# Patient Record
Sex: MALE | Race: WHITE | Employment: OTHER | URBAN - METROPOLITAN AREA
[De-identification: names, ages, dates, MRNs, and addresses within clinical notes are randomized per-mention and may not be internally consistent; named-entity substitution may affect disease eponyms.]

---

## 2019-08-05 ENCOUNTER — OFFICE VISIT (OUTPATIENT)
Dept: FAMILY MEDICINE | Facility: CLINIC | Age: 24
End: 2019-08-05
Payer: COMMERCIAL

## 2019-08-05 VITALS
DIASTOLIC BLOOD PRESSURE: 84 MMHG | HEIGHT: 76 IN | OXYGEN SATURATION: 98 % | WEIGHT: 188 LBS | BODY MASS INDEX: 22.89 KG/M2 | SYSTOLIC BLOOD PRESSURE: 132 MMHG | TEMPERATURE: 98.6 F | HEART RATE: 63 BPM

## 2019-08-05 DIAGNOSIS — Z00.00 ROUTINE GENERAL MEDICAL EXAMINATION AT A HEALTH CARE FACILITY: Primary | ICD-10-CM

## 2019-08-05 DIAGNOSIS — Z13.6 CARDIOVASCULAR SCREENING; LDL GOAL LESS THAN 100: ICD-10-CM

## 2019-08-05 DIAGNOSIS — Z72.0 TOBACCO ABUSE: ICD-10-CM

## 2019-08-05 DIAGNOSIS — Z13.1 SCREENING FOR DIABETES MELLITUS: ICD-10-CM

## 2019-08-05 DIAGNOSIS — I10 ESSENTIAL HYPERTENSION: ICD-10-CM

## 2019-08-05 LAB
ANION GAP SERPL CALCULATED.3IONS-SCNC: 7 MMOL/L (ref 3–14)
BUN SERPL-MCNC: 18 MG/DL (ref 7–30)
CALCIUM SERPL-MCNC: 9.6 MG/DL (ref 8.5–10.1)
CHLORIDE SERPL-SCNC: 101 MMOL/L (ref 94–109)
CHOLEST SERPL-MCNC: 141 MG/DL
CO2 SERPL-SCNC: 27 MMOL/L (ref 20–32)
CREAT SERPL-MCNC: 1 MG/DL (ref 0.66–1.25)
GFR SERPL CREATININE-BSD FRML MDRD: >90 ML/MIN/{1.73_M2}
GLUCOSE SERPL-MCNC: 81 MG/DL (ref 70–99)
HDLC SERPL-MCNC: 52 MG/DL
LDLC SERPL CALC-MCNC: 79 MG/DL
NONHDLC SERPL-MCNC: 89 MG/DL
POTASSIUM SERPL-SCNC: 4.2 MMOL/L (ref 3.4–5.3)
SODIUM SERPL-SCNC: 135 MMOL/L (ref 133–144)
TRIGL SERPL-MCNC: 51 MG/DL

## 2019-08-05 PROCEDURE — 80048 BASIC METABOLIC PNL TOTAL CA: CPT | Performed by: FAMILY MEDICINE

## 2019-08-05 PROCEDURE — 99385 PREV VISIT NEW AGE 18-39: CPT | Performed by: FAMILY MEDICINE

## 2019-08-05 PROCEDURE — 99213 OFFICE O/P EST LOW 20 MIN: CPT | Mod: 25 | Performed by: FAMILY MEDICINE

## 2019-08-05 PROCEDURE — 80061 LIPID PANEL: CPT | Performed by: FAMILY MEDICINE

## 2019-08-05 PROCEDURE — 36415 COLL VENOUS BLD VENIPUNCTURE: CPT | Performed by: FAMILY MEDICINE

## 2019-08-05 RX ORDER — LOSARTAN POTASSIUM 50 MG/1
50 TABLET ORAL DAILY
COMMUNITY
End: 2019-08-05

## 2019-08-05 RX ORDER — LOSARTAN POTASSIUM 50 MG/1
50 TABLET ORAL DAILY
Qty: 90 TABLET | Refills: 3 | Status: SHIPPED | OUTPATIENT
Start: 2019-08-05 | End: 2020-12-09

## 2019-08-05 ASSESSMENT — ANXIETY QUESTIONNAIRES
2. NOT BEING ABLE TO STOP OR CONTROL WORRYING: NOT AT ALL
GAD7 TOTAL SCORE: 3
5. BEING SO RESTLESS THAT IT IS HARD TO SIT STILL: NOT AT ALL
6. BECOMING EASILY ANNOYED OR IRRITABLE: SEVERAL DAYS
7. FEELING AFRAID AS IF SOMETHING AWFUL MIGHT HAPPEN: NOT AT ALL
1. FEELING NERVOUS, ANXIOUS, OR ON EDGE: SEVERAL DAYS
3. WORRYING TOO MUCH ABOUT DIFFERENT THINGS: NOT AT ALL

## 2019-08-05 ASSESSMENT — PATIENT HEALTH QUESTIONNAIRE - PHQ9
SUM OF ALL RESPONSES TO PHQ QUESTIONS 1-9: 3
5. POOR APPETITE OR OVEREATING: SEVERAL DAYS

## 2019-08-05 ASSESSMENT — MIFFLIN-ST. JEOR: SCORE: 1936.32

## 2019-08-05 NOTE — PROGRESS NOTES
SUBJECTIVE:   CC: Ovidio Burns is an 23 year old male who presents for preventive health visit.     Healthy Habits:    Do you get at least three servings of calcium containing foods daily (dairy, green leafy vegetables, etc.)? yes    Amount of exercise or daily activities, outside of work: 7 day(s) per week    Problems taking medications regularly Yes     Medication side effects: Yes freq urination    Have you had an eye exam in the past two years? no    Do you see a dentist twice per year? yes    Do you have sleep apnea, excessive snoring or daytime drowsiness?trouble falling asleep. Takes melatonin          Today's PHQ-2 Score: No flowsheet data found.    Abuse: Current or Past(Physical, Sexual or Emotional)- No  Do you feel safe in your environment? Yes    Social History     Tobacco Use     Smoking status: Former Smoker     Types: Dip, chew, snus or snuff     Smokeless tobacco: Current User     Types: Chew     Tobacco comment: chews-tin last 1-2 days   Substance Use Topics     Alcohol use: Yes     Comment: 4 drinks per week     If you drink alcohol do you typically have >3 drinks per day or >7 drinks per week? No                      Last PSA: No results found for: PSA    Reviewed orders with patient. Reviewed health maintenance and updated orders accordingly - Yes      Reviewed and updated as needed this visit by clinical staff  Tobacco  Allergies  Meds  Med Hx  Surg Hx  Fam Hx  Soc Hx        Reviewed and updated as needed this visit by Provider            ROS:  Review Of Systems  Skin: negative  Eyes: negative  Ears/Nose/Throat: negative  Respiratory: No shortness of breath, dyspnea on exertion, cough, or hemoptysis  Cardiovascular: negative  Gastrointestinal: negative  Genitourinary: urinary frequency some, no dyuria.  Has some mild ed.   Musculoskeletal: negative  Neurologic: negative  Psychiatric: negative  Hematologic/Lymphatic/Immunologic: negative  Endocrine: negative      OBJECTIVE:   BP  "132/84 (Cuff Size: Adult Large)   Pulse 63   Temp 98.6  F (37  C) (Tympanic)   Ht 1.918 m (6' 3.5\")   Wt 85.3 kg (188 lb)   SpO2 98%   BMI 23.19 kg/m    EXAM:  GENERAL: healthy, alert and no distress  EYES: Eyes grossly normal to inspection, PERRL and conjunctivae and sclerae normal  HENT: ear canals and TM's normal, nose and mouth without ulcers or lesions  NECK: no adenopathy, no asymmetry, masses, or scars and thyroid normal to palpation  RESP: lungs clear to auscultation - no rales, rhonchi or wheezes  CV: regular rate and rhythm, normal S1 S2, no S3 or S4, no murmur, click or rub, no peripheral edema and peripheral pulses strong  ABDOMEN: soft, nontender, no hepatosplenomegaly, no masses and bowel sounds normal   (male): normal male genitalia without lesions or urethral discharge, no hernia  MS: no gross musculoskeletal defects noted, no edema  SKIN: no suspicious lesions or rashes  NEURO: Normal strength and tone, mentation intact and speech normal  PSYCH: mentation appears normal, affect normal/bright  LYMPH: anterior cervical: no adenopathy  posterior cervical: no adenopathy        ASSESSMENT/PLAN:   (Z00.00) Routine general medical examination at a health care facility  (primary encounter diagnosis)  Comment: Discussed healthy lifestyle and preventative cares.    Plan:     (I10) Essential hypertension  Comment: controlled, reviewed medication, he has some concerns with urinary frequency.  In addition has some mild ED where it takes longer to get an erection.    Plan: Basic metabolic panel, losartan (COZAAR) 50 MG         tablet, OFFICE/OUTPT VISIT,EST,LEVL III            (Z72.0) Tobacco abuse  Comment: recommend to quit and information is given.  Plan: OFFICE/OUTPT VISIT,EST,LEVL III            (Z13.1) Screening for diabetes mellitus  Comment:   Plan:     (Z13.6) CARDIOVASCULAR SCREENING; LDL GOAL LESS THAN 100  Comment:   Plan: Lipid panel reflex to direct LDL Fasting        " "      COUNSELING:  Reviewed preventive health counseling, as reflected in patient instructions       Regular exercise       Healthy diet/nutrition       Vision screening    Estimated body mass index is 23.19 kg/m  as calculated from the following:    Height as of this encounter: 1.918 m (6' 3.5\").    Weight as of this encounter: 85.3 kg (188 lb).         reports that he has quit smoking. His smoking use included dip, chew, snus or snuff. His smokeless tobacco use includes chew.  Tobacco Cessation Action Plan: he has information for quit plan    Counseling Resources:  ATP IV Guidelines  Pooled Cohorts Equation Calculator  FRAX Risk Assessment  ICSI Preventive Guidelines  Dietary Guidelines for Americans, 2010  USDA's MyPlate  ASA Prophylaxis  Lung CA Screening    Alfredito Stephenson MD  [facility] [place]  "

## 2019-08-05 NOTE — PATIENT INSTRUCTIONS
Please go to lab.    I refilled your medication.    If you do have further issues with the medication please let me know and I will change your losartan medication to another one.  I would consider starting amlodipine 2.5 mg a day.    Please be aware that there will be an additional charge during your preventative visit due to either a new diagnosis and/or chronic disease management.    Preventative visits screen for diseases prior to they occur.  They do not cover for any new diagnosis or chronic disease management.     If you have questions regarding your coverage please check with your insurance provider.  At [facility] we need to code correctly to be in compliance with all insurance companies.      Preventive Health Recommendations  Male Ages 21 - 25     Yearly exam:             See your health care provider every year in order to  o   Review health changes.   o   Discuss preventive care.    o   Review your medicines if your doctor has prescribed any.    You should be tested each year for STDs (sexually transmitted diseases).     Talk to your provider about cholesterol testing.      If you are at risk for diabetes, you should have a diabetes test (fasting glucose).    Shots: Get a flu shot each year. Get a tetanus shot every 10 years.     Nutrition:    Eat at least 5 servings of fruits and vegetables daily.     Eat whole-grain bread, whole-wheat pasta and brown rice instead of white grains and rice.     Get adequate calcium and Vitamin D.     Lifestyle    Exercise for at least 150 minutes a week (30 minutes a day, 5 days a week). This will help you control your weight and prevent disease.     Limit alcohol to one drink per day.     No smoking.     Wear sunscreen to prevent skin cancer.     See your dentist every six months for an exam and cleaning.

## 2019-08-06 ASSESSMENT — ASTHMA QUESTIONNAIRES: ACT_TOTALSCORE: 24

## 2019-08-06 ASSESSMENT — ANXIETY QUESTIONNAIRES: GAD7 TOTAL SCORE: 3

## 2019-08-13 PROBLEM — I10 ESSENTIAL HYPERTENSION: Status: ACTIVE | Noted: 2019-08-13

## 2020-04-15 ENCOUNTER — HOSPITAL ENCOUNTER (EMERGENCY)
Facility: CLINIC | Age: 25
Discharge: HOME | End: 2020-04-15
Attending: FAMILY MEDICINE | Admitting: FAMILY MEDICINE
Payer: COMMERCIAL

## 2020-04-15 ENCOUNTER — APPOINTMENT (OUTPATIENT)
Dept: GENERAL RADIOLOGY | Facility: CLINIC | Age: 25
End: 2020-04-15
Attending: FAMILY MEDICINE
Payer: COMMERCIAL

## 2020-04-15 ENCOUNTER — NURSE TRIAGE (OUTPATIENT)
Dept: FAMILY MEDICINE | Facility: CLINIC | Age: 25
End: 2020-04-15

## 2020-04-15 VITALS
RESPIRATION RATE: 17 BRPM | DIASTOLIC BLOOD PRESSURE: 108 MMHG | BODY MASS INDEX: 25.49 KG/M2 | WEIGHT: 205 LBS | HEART RATE: 75 BPM | TEMPERATURE: 98 F | OXYGEN SATURATION: 96 % | SYSTOLIC BLOOD PRESSURE: 155 MMHG | HEIGHT: 75 IN

## 2020-04-15 DIAGNOSIS — R07.89 CHEST TIGHTNESS: ICD-10-CM

## 2020-04-15 DIAGNOSIS — R09.A2 GLOBUS SENSATION: ICD-10-CM

## 2020-04-15 DIAGNOSIS — R00.2 PALPITATIONS: ICD-10-CM

## 2020-04-15 LAB
ANION GAP SERPL CALCULATED.3IONS-SCNC: 4 MMOL/L (ref 3–14)
BASOPHILS # BLD AUTO: 0.1 10E9/L (ref 0–0.2)
BASOPHILS NFR BLD AUTO: 1.2 %
BUN SERPL-MCNC: 13 MG/DL (ref 7–30)
CALCIUM SERPL-MCNC: 9.9 MG/DL (ref 8.5–10.1)
CHLORIDE SERPL-SCNC: 101 MMOL/L (ref 94–109)
CO2 SERPL-SCNC: 29 MMOL/L (ref 20–32)
CREAT SERPL-MCNC: 1.31 MG/DL (ref 0.66–1.25)
DIFFERENTIAL METHOD BLD: NORMAL
EOSINOPHIL # BLD AUTO: 0 10E9/L (ref 0–0.7)
EOSINOPHIL NFR BLD AUTO: 0.5 %
ERYTHROCYTE [DISTWIDTH] IN BLOOD BY AUTOMATED COUNT: 12.9 % (ref 10–15)
GFR SERPL CREATININE-BSD FRML MDRD: 75 ML/MIN/{1.73_M2}
GLUCOSE SERPL-MCNC: 148 MG/DL (ref 70–99)
HCT VFR BLD AUTO: 47.3 % (ref 40–53)
HGB BLD-MCNC: 15.3 G/DL (ref 13.3–17.7)
IMM GRANULOCYTES # BLD: 0 10E9/L (ref 0–0.4)
IMM GRANULOCYTES NFR BLD: 0.7 %
LYMPHOCYTES # BLD AUTO: 1.4 10E9/L (ref 0.8–5.3)
LYMPHOCYTES NFR BLD AUTO: 23 %
MCH RBC QN AUTO: 28.8 PG (ref 26.5–33)
MCHC RBC AUTO-ENTMCNC: 32.3 G/DL (ref 31.5–36.5)
MCV RBC AUTO: 89 FL (ref 78–100)
MONOCYTES # BLD AUTO: 0.8 10E9/L (ref 0–1.3)
MONOCYTES NFR BLD AUTO: 12.7 %
NEUTROPHILS # BLD AUTO: 3.8 10E9/L (ref 1.6–8.3)
NEUTROPHILS NFR BLD AUTO: 61.9 %
NRBC # BLD AUTO: 0 10*3/UL
NRBC BLD AUTO-RTO: 0 /100
PLATELET # BLD AUTO: 417 10E9/L (ref 150–450)
POTASSIUM SERPL-SCNC: 3.6 MMOL/L (ref 3.4–5.3)
RBC # BLD AUTO: 5.32 10E12/L (ref 4.4–5.9)
SODIUM SERPL-SCNC: 134 MMOL/L (ref 133–144)
TROPONIN I SERPL-MCNC: <0.015 UG/L (ref 0–0.04)
TSH SERPL DL<=0.005 MIU/L-ACNC: 1.77 MU/L (ref 0.4–4)
WBC # BLD AUTO: 6.1 10E9/L (ref 4–11)

## 2020-04-15 PROCEDURE — 93010 ELECTROCARDIOGRAM REPORT: CPT | Mod: Z6 | Performed by: FAMILY MEDICINE

## 2020-04-15 PROCEDURE — 85025 COMPLETE CBC W/AUTO DIFF WBC: CPT | Performed by: FAMILY MEDICINE

## 2020-04-15 PROCEDURE — 84443 ASSAY THYROID STIM HORMONE: CPT | Performed by: FAMILY MEDICINE

## 2020-04-15 PROCEDURE — 99285 EMERGENCY DEPT VISIT HI MDM: CPT | Performed by: FAMILY MEDICINE

## 2020-04-15 PROCEDURE — 71046 X-RAY EXAM CHEST 2 VIEWS: CPT

## 2020-04-15 PROCEDURE — 99285 EMERGENCY DEPT VISIT HI MDM: CPT | Mod: 25 | Performed by: FAMILY MEDICINE

## 2020-04-15 PROCEDURE — 93005 ELECTROCARDIOGRAM TRACING: CPT | Performed by: FAMILY MEDICINE

## 2020-04-15 PROCEDURE — 84484 ASSAY OF TROPONIN QUANT: CPT | Performed by: FAMILY MEDICINE

## 2020-04-15 PROCEDURE — 80048 BASIC METABOLIC PNL TOTAL CA: CPT | Performed by: FAMILY MEDICINE

## 2020-04-15 ASSESSMENT — ENCOUNTER SYMPTOMS
CHILLS: 0
DIARRHEA: 0
NAUSEA: 0
SORE THROAT: 0
WHEEZING: 0
PALPITATIONS: 1
VOMITING: 0
FEVER: 0
BLOOD IN STOOL: 0
ABDOMINAL PAIN: 0
SHORTNESS OF BREATH: 1
COUGH: 0
HEADACHES: 0
DIAPHORESIS: 1
DYSURIA: 0
SINUS PRESSURE: 0
CONSTIPATION: 0
FREQUENCY: 0

## 2020-04-15 ASSESSMENT — MIFFLIN-ST. JEOR: SCORE: 2000.5

## 2020-04-15 NOTE — ED PROVIDER NOTES
History     Chief Complaint   Patient presents with     Palpitations     intermittent x 3 days.  some SOA last NOC     HPI  Ovidio Burns is a 24 year old male who presents with palpitations that have been intermittent over the last 3 days with last night starting with some sensation of anterior chest pressure midline along with a sensation of neck discomfort and no radiation to the back or to the arms.  No significant exertional pain but does have increasing pain when he was doing weights in his garage.  Associated sweats last evening no fever cough upper respiratory congestion.  No VTE risk.  No history of asthma.  No known coronary disease.  No stimulant use.  Denies any use of illicit drugs.  No use of methamphetamine or cocaine or bath salts.  He does use tobacco chewing and occasional alcohol.  Walking does not necessarily worsen his symptoms although there may be mild dyspnea on exertion.  No associated wheezing.      He describes palpitations that occur periodically and are followed by an sense of a strong beat likely consistent with PVCs.  Also with globus sensation in the throat.  Denies significant panic or anxiety.    Denies recent prolonged travel (>3 hours) by car or plane, history or FHx of venous thromboembolism, recent surgery (last 4 weeks), active cancer history, hypercoagulable state, estrogen or other medications/conditions causing VTE or  new unilateral swelling or pain in the legs or calves.    Allergies:  Allergies   Allergen Reactions     Lisinopril      Numb fingers       Problem List:    Patient Active Problem List    Diagnosis Date Noted     Essential hypertension 08/13/2019     Priority: Medium     Mild major depression (H) 01/07/2015     Priority: Medium     Anxiety 01/07/2015     Priority: Medium     Uvular hypertrophy 09/10/2012     Priority: Medium     Exercise-induced asthma 04/28/2009     Priority: Medium        Past Medical History:    Past Medical History:   Diagnosis Date  "    Other abnormal heart sounds At Birth       Past Surgical History:    Past Surgical History:   Procedure Laterality Date     CIRCUMCISION,CLAMP  1994     HC TOOTH EXTRACTION W/FORCEP  2002       Family History:    Family History   Problem Relation Age of Onset     Hypertension Maternal Grandmother      Breast Cancer Paternal Grandmother      Diabetes Paternal Grandfather      Hypertension Paternal Grandfather      Depression Mother      Hypertension Father      Cerebrovascular Disease Maternal Grandfather      Heart Disease Maternal Grandfather         MIs and CABG     Mental Illness Brother         anxiety       Social History:  Marital Status:  Single [1]  Social History     Tobacco Use     Smoking status: Former Smoker     Types: Dip, chew, snus or snuff     Smokeless tobacco: Current User     Types: Chew     Tobacco comment: chews-tin last 1-2 days   Substance Use Topics     Alcohol use: Yes     Comment: 4 drinks per week     Drug use: No        Medications:    losartan (COZAAR) 50 MG tablet          Review of Systems   Constitutional: Positive for diaphoresis. Negative for chills and fever.   HENT: Negative for ear pain, sinus pressure and sore throat.    Eyes: Negative for visual disturbance.   Respiratory: Positive for shortness of breath. Negative for cough and wheezing.    Cardiovascular: Positive for chest pain and palpitations.   Gastrointestinal: Negative for abdominal pain, blood in stool, constipation, diarrhea, nausea and vomiting.   Genitourinary: Negative for dysuria, frequency and urgency.   Skin: Negative for rash.   Neurological: Negative for headaches.   All other systems reviewed and are negative.      Physical Exam   BP: (!) 155/108  Pulse: 75(irregular.  75-89 in triage)  Temp: 98  F (36.7  C)  Resp: 18  Height: 190.5 cm (6' 3\")  Weight: 93 kg (205 lb)  SpO2: 98 %      Physical Exam  Constitutional:       General: He is not in acute distress.     Appearance: He is not ill-appearing, " toxic-appearing or diaphoretic.   HENT:      Nose: Nose normal.      Mouth/Throat:      Pharynx: Oropharynx is clear.   Eyes:      Conjunctiva/sclera: Conjunctivae normal.   Neck:      Musculoskeletal: Neck supple.   Cardiovascular:      Rate and Rhythm: Regular rhythm.      Heart sounds: No murmur. No friction rub. No gallop.    Pulmonary:      Effort: No respiratory distress.      Breath sounds: No stridor. No wheezing or rhonchi.   Abdominal:      General: Abdomen is flat. Bowel sounds are normal. There is no distension.      Palpations: Abdomen is soft. There is no mass.      Tenderness: There is no abdominal tenderness.      Hernia: No hernia is present.   Musculoskeletal:      Right lower leg: No edema.      Left lower leg: No edema.   Skin:     Coloration: Skin is not pale.      Findings: No rash.   Neurological:      General: No focal deficit present.      Mental Status: He is alert.         ED Course        Procedures                 EKG Interpretation:      Interpreted by Deep Amezcua MD  EKG done at 1010 hrs. demonstrates a sinus rhythm at 78 bpm with a normal axis and no ST change.  T waves are prominent across the precordium especially V2 and V3.  Normal R progression and no Q waves.  Normal intervals.  Normal conduction.  No ectopy.  Impression sinus rhythm at 70 bpm and no old EKG to compare.    Critical Care time:  none               Results for orders placed or performed during the hospital encounter of 04/15/20 (from the past 24 hour(s))   CBC with platelets differential   Result Value Ref Range    WBC 6.1 4.0 - 11.0 10e9/L    RBC Count 5.32 4.4 - 5.9 10e12/L    Hemoglobin 15.3 13.3 - 17.7 g/dL    Hematocrit 47.3 40.0 - 53.0 %    MCV 89 78 - 100 fl    MCH 28.8 26.5 - 33.0 pg    MCHC 32.3 31.5 - 36.5 g/dL    RDW 12.9 10.0 - 15.0 %    Platelet Count 417 150 - 450 10e9/L    Diff Method Automated Method     % Neutrophils 61.9 %    % Lymphocytes 23.0 %    % Monocytes 12.7 %    % Eosinophils 0.5 %     % Basophils 1.2 %    % Immature Granulocytes 0.7 %    Nucleated RBCs 0 0 /100    Absolute Neutrophil 3.8 1.6 - 8.3 10e9/L    Absolute Lymphocytes 1.4 0.8 - 5.3 10e9/L    Absolute Monocytes 0.8 0.0 - 1.3 10e9/L    Absolute Eosinophils 0.0 0.0 - 0.7 10e9/L    Absolute Basophils 0.1 0.0 - 0.2 10e9/L    Abs Immature Granulocytes 0.0 0 - 0.4 10e9/L    Absolute Nucleated RBC 0.0    Basic metabolic panel   Result Value Ref Range    Sodium 134 133 - 144 mmol/L    Potassium 3.6 3.4 - 5.3 mmol/L    Chloride 101 94 - 109 mmol/L    Carbon Dioxide 29 20 - 32 mmol/L    Anion Gap 4 3 - 14 mmol/L    Glucose 148 (H) 70 - 99 mg/dL    Urea Nitrogen 13 7 - 30 mg/dL    Creatinine 1.31 (H) 0.66 - 1.25 mg/dL    GFR Estimate 75 >60 mL/min/[1.73_m2]    GFR Estimate If Black 87 >60 mL/min/[1.73_m2]    Calcium 9.9 8.5 - 10.1 mg/dL   TSH with free T4 reflex   Result Value Ref Range    TSH 1.77 0.40 - 4.00 mU/L   Troponin I   Result Value Ref Range    Troponin I ES <0.015 0.000 - 0.045 ug/L   XR Chest 2 Views    Narrative    CHEST TWO VIEWS 4/15/2020 10:34 AM     HISTORY: Palpitations.    COMPARISON: 1/30/2012    FINDINGS: Heart size and pulmonary vascularity are within normal  limits. The lungs are clear. No pneumothorax or pleural effusion.       Impression    IMPRESSION: Normal 2 views of the chest.     YULY HANSON MD       Medications - No data to display    Assessments & Plan (with Medical Decision Making)     MDM: Ovidio LEOBARDO Grant is a 24 year old male who presents with a history of tobacco chewing, and with a sensation of palpitations a globus sensation in the neck, a central chest pressure periodically and relatively nonexertional although when using the chest for weight lifting he does experience increased chest pressure.    Differential diagnosis could include chest wall pain gastroesophageal reflux, pericarditis and myocarditis as well as PVCs, anxiety, and globus sensation.  Doubt referred pain - his abdomen is benign.    His  examination, EKG, laboratory testing and chest x-ray are all reassuring.  Blood pressure was elevated but did decrease while in the department.    We discussed management as below and precautions given for return.    I have reviewed the nursing notes.    I have reviewed the findings, diagnosis, plan and need for follow up with the patient.       New Prescriptions    No medications on file       Final diagnoses:   Palpitations - These appear to be PVCs which are not concerning.  maintain hydration.  avoid caffeine.  consider associated causes withy chest tightness and globus sensatiuon   Chest tightness - no serious causes.  acid reflux can do this and cause neck pain. prilosec 20 mg daily for 1 month.  consider zantac or pepcid for first week.  avoid food 2 hours before bed.  stay hydrated with clear fluid.  avoid caffeine and chewing tobacco and alcohol.  chest wall pain is also possible pablo. with weight lifting.   Globus sensation - this could be caused by acid refloux.  other causes include anxiousness.  consider anxiousness as contributing       4/15/2020   [facility] EMERGENCY DEPARTMENT     Deep Amezcua MD  04/15/20 1527

## 2020-04-15 NOTE — ED AVS SNAPSHOT
[facility] Emergency Department  [address] [address]  Phone:  [phone]  Fax:  [phone]                                    Ovidio Burns   MRN: [id number]    Department:  [facility] Emergency Department   Date of Visit:  4/15/2020           After Visit Summary Signature Page    I have received my discharge instructions, and my questions have been answered. I have discussed any challenges I see with this plan with the nurse or doctor.    ..........................................................................................................................................  Patient/Patient Representative Signature      ..........................................................................................................................................  Patient Representative Print Name and Relationship to Patient    ..................................................               ................................................  Date                                   Time    ..........................................................................................................................................  Reviewed by Signature/Title    ...................................................              ..............................................  Date                                               Time          22EPIC Rev 08/18

## 2020-04-15 NOTE — DISCHARGE INSTRUCTIONS
ICD-10-CM    1. Palpitations  R00.2     These appear to be PVCs which are not concerning.  maintain hydration.  avoid caffeine.  consider associated causes withy chest tightness and globus sensatiuon   2. Chest tightness  R07.89     no serious causes.  acid reflux can do this and cause neck pain. prilosec 20 mg daily for 1 month.  consider zantac or pepcid for first week.  avoid food 2 hours before bed.  stay hydrated with clear fluid.  avoid caffeine and chewing tobacco and alcohol.  chest wall pain is also possible pablo. with weight lifting.   3. Globus sensation  R09.89     this could be caused by acid refloux.  other causes include anxiousness.  consider anxiousness as contributing

## 2020-04-15 NOTE — TELEPHONE ENCOUNTER
"  Reason for Disposition    Dizziness, lightheadedness, or weakness    Additional Information    Negative: Passed out (i.e., fainted, collapsed and was not responding)    Negative: Shock suspected (e.g., cold/pale/clammy skin, too weak to stand, low BP, rapid pulse)    Negative: Difficult to awaken or acting confused (e.g., disoriented, slurred speech)    Negative: Visible sweat on face or sweat dripping down face    Negative: Unable to walk, or can only walk with assistance (e.g., requires support)    Negative: Received SHOCK from implantable cardiac defibrillator and has persisting symptoms (i.e., palpitations, lightheadedness)    Negative: Sounds like a life-threatening emergency to the triager    Negative: Chest pain    Negative: Difficulty breathing    Answer Assessment - Initial Assessment Questions  1. DESCRIPTION: \"Please describe your heart rate or heart beat that you are having\" (e.g., fast/slow, regular/irregular, skipped or extra beats, \"palpitations\")      Few days ago noticed that he felt a skip and then lump in his throat.  Beating regularly and then misses a beat and then when beats again feels like a hard beat.  Current HR - 84  2. ONSET: \"When did it start?\" (Minutes, hours or days)       A few days ago.  3. DURATION: \"How long does it last\" (e.g., seconds, minutes, hours)      Once every 30 seconds.    4. PATTERN \"Does it come and go, or has it been constant since it started?\"  \"Does it get worse with exertion?\"   \"Are you feeling it now?\"      See # 3  5. TAP: \"Using your hand, can you tap out what you are feeling on a chair or table in front of you, so that I can hear?\" (Note: not all patients can do this)        Feels regular until the missed beat.  6. HEART RATE: \"Can you tell me your heart rate?\" \"How many beats in 15 seconds?\"  (Note: not all patients can do this)        84  7. RECURRENT SYMPTOM: \"Have you ever had this before?\" If so, ask: \"When was the last time?\" and \"What happened that " "time?\"       No, never  8. CAUSE: \"What do you think is causing the palpitations?\"      Unknown - no known increase in energy drinks or caffeine or medications.  9. CARDIAC HISTORY: \"Do you have any history of heart disease?\" (e.g., heart attack, angina, bypass surgery, angioplasty, arrhythmia)       HTN - losartan takes regularly. Normal mid  140 over high 80s  10. OTHER SYMPTOMS: \"Do you have any other symptoms?\" (e.g., dizziness, chest pain, sweating, difficulty breathing)        Yes, sweaty and lightheaded.  No fever, no cough  11. PREGNANCY: \"Is there any chance you are pregnant?\" \"When was your last menstrual period?\"        N/A    Protocols used: HEART RATE AND HEARTBEAT [id number]    "

## 2020-04-15 NOTE — TELEPHONE ENCOUNTER
Reason for call:  Patient reporting a symptom    Symptom or request: Irregular heartbeat, missing a beat, patient gets lump in his throat.     Duration (how long have symptoms been present): In the last week, really noticed it yesterday, 4/14/2020    Have you been treated for this before? No    Additional comments: Patient has high blood pressure and is on medication and family history of high bp, heart attack.    Phone Number patient can be reached at:  Home number on file [phone] (home)    Best Time:  Any    Can we leave a detailed message on this number:  YES    Call taken on 4/15/2020 at 9:05 AM by Elizabeth Salgado

## 2020-04-21 ENCOUNTER — VIRTUAL VISIT (OUTPATIENT)
Dept: FAMILY MEDICINE | Facility: CLINIC | Age: 25
End: 2020-04-21
Payer: COMMERCIAL

## 2020-04-21 DIAGNOSIS — I49.9 IRREGULAR HEART BEAT: Primary | ICD-10-CM

## 2020-04-21 PROCEDURE — 99213 OFFICE O/P EST LOW 20 MIN: CPT | Mod: 95 | Performed by: FAMILY MEDICINE

## 2020-04-21 NOTE — PROGRESS NOTES
"Ovidio Burns is a 24 year old male who is being evaluated via a billable telephone visit.      The patient has been notified of following:     \"This telephone visit will be conducted via a call between you and your physician/provider. We have found that certain health care needs can be provided without the need for a physical exam.  This service lets us provide the care you need with a short phone conversation.  If a prescription is necessary we can send it directly to your pharmacy.  If lab work is needed we can place an order for that and you can then stop by our lab to have the test done at a later time.    Telephone visits are billed at different rates depending on your insurance coverage. During this emergency period, for some insurers they may be billed the same as an in-person visit.  Please reach out to your insurance provider with any questions.    If during the course of the call the physician/provider feels a telephone visit is not appropriate, you will not be charged for this service.\"    Patient has given verbal consent for Telephone visit?  Yes    How would you like to obtain your AVS? Mail a copy    Subjective     Ovidio Burns is a 24 year old male who presents to clinic today for the following health issues:    HPI  ED/UC Followup:    Facility:  [facility]  Date of visit: 4/15/20  Reason for visit: Palpitation, Chest tightness, Globus sensations  Current Status: Since the ER visit he reports still having issues with having tightness in his chest and heart palpitations. He states they have gotten better, but not resolved.                    Reviewed and updated as needed this visit by Provider  Tobacco  Allergies  Meds  Problems  Med Hx  Surg Hx  Fam Hx         Review of Systems   Review Of Systems  Skin: negative  Eyes:   Ears/Nose/Throat: as above  Respiratory: No shortness of breath, dyspnea on exertion, cough, or hemoptysis  Cardiovascular: as above  Gastrointestinal: " negative  Genitourinary:   Musculoskeletal: negative  Neurologic: negative  Psychiatric: as above  Hematologic/Lymphatic/Immunologic:   Endocrine:          Objective   Reported vitals:  There were no vitals taken for this visit.   healthy, alert and no distress  PSYCH: Alert and oriented times 3; coherent speech, normal   rate and volume, able to articulate logical thoughts, able   to abstract reason, no tangential thoughts, no hallucinations   or delusions  His affect is normal  RESP: No cough, no audible wheezing, able to talk in full sentences  Remainder of exam unable to be completed due to telephone visits            Assessment/Plan:  1. Irregular heart beat  Will check to see if there are benign beats or something more underlying.  Will get a zio patch for 1-2 week and discussed with Ovidio.  Reviewed his current labs and last fall labs.  Discussed family history.  Discussed I do want him active and not modifying his life so that we can see if something shows up or not.  - Zio Patch Holter Adult Pediatric Greater than 48 hrs; Future    Return in about 4 weeks (around 5/19/2020) for If not better.      Phone call duration:  21 minutes    Alfredito Stephenson MD

## 2020-04-21 NOTE — PATIENT INSTRUCTIONS
Due to your irregular heart rate or beat that you are feeling I do want to get a zio patch monitor.    Please call the [facility] Cardiology department at [phone].      Thank you for choosing [facility].  You may be receiving an email and/or telephone survey request from [facility] Customer Experience regarding your visit today.  Please take a few minutes to respond to the survey to let us know how we are doing.      If you have questions or concerns, please contact us via [employer] or you can contact your care team at [phone].    Our Clinic hours are:  Monday 6:40 am  to 7:00 pm  Tuesday -Friday 6:40 am to 5:00 pm    The [place] outpatient lab hours are:  Monday - Friday 6:10 am to 4:45 pm  Saturdays 7:00 am to 11:00 am  Appointments are required, call [phone]    If you have clinical questions after hours or would like to schedule an appointment,  call the clinic at [phone].

## 2020-04-28 ENCOUNTER — HOSPITAL ENCOUNTER (OUTPATIENT)
Dept: CARDIOLOGY | Facility: CLINIC | Age: 25
Discharge: HOME | End: 2020-04-28
Attending: FAMILY MEDICINE | Admitting: FAMILY MEDICINE
Payer: COMMERCIAL

## 2020-04-28 DIAGNOSIS — I49.9 IRREGULAR HEART BEAT: ICD-10-CM

## 2020-04-28 PROCEDURE — 0298T ZIO PATCH HOLTER ADULT PEDIATRIC GREATER THAN 48 HRS: CPT | Performed by: INTERNAL MEDICINE

## 2020-04-28 PROCEDURE — 0296T ZIO PATCH HOLTER ADULT PEDIATRIC GREATER THAN 48 HRS: CPT

## 2020-09-14 ENCOUNTER — TELEPHONE (OUTPATIENT)
Dept: FAMILY MEDICINE | Facility: CLINIC | Age: 25
End: 2020-09-14

## 2020-09-14 NOTE — TELEPHONE ENCOUNTER
Patient Quality Outreach Summary      Summary:    Patient is due/failing the following:   ACT needed and PHQ-9 Needed    Type of outreach:    Sent letter.    Questions for provider review:    None                                                                                                                    KIMBERLY Sibley MA

## 2020-09-14 NOTE — LETTER
September 14, 2020      Ovidio Burns  [address] [address]        Dear Ovidio,     In order to ensure we are providing the best quality care, we have reviewed your chart and see that you are due for:  An update on the depression and anxiety questionnaires and asthma control test.  These tools help your provider to monitor and manage your symptoms and treatment plan.  Please complete the attached questionnaires and send back to the clinic.    Please call the clinic with any questions or concerns.    Thank you for trusting us with your health care.  Sincerely,    Your [facility] Team/lw

## 2020-12-09 ENCOUNTER — OFFICE VISIT (OUTPATIENT)
Dept: FAMILY MEDICINE | Facility: CLINIC | Age: 25
End: 2020-12-09
Payer: COMMERCIAL

## 2020-12-09 VITALS
TEMPERATURE: 99 F | SYSTOLIC BLOOD PRESSURE: 128 MMHG | HEART RATE: 76 BPM | OXYGEN SATURATION: 97 % | BODY MASS INDEX: 24.12 KG/M2 | DIASTOLIC BLOOD PRESSURE: 70 MMHG | HEIGHT: 75 IN | RESPIRATION RATE: 16 BRPM | WEIGHT: 194 LBS

## 2020-12-09 DIAGNOSIS — F32.0 MILD MAJOR DEPRESSION (H): ICD-10-CM

## 2020-12-09 DIAGNOSIS — Z13.6 CARDIOVASCULAR SCREENING; LDL GOAL LESS THAN 100: ICD-10-CM

## 2020-12-09 DIAGNOSIS — Z00.00 ROUTINE GENERAL MEDICAL EXAMINATION AT A HEALTH CARE FACILITY: Primary | ICD-10-CM

## 2020-12-09 DIAGNOSIS — I10 ESSENTIAL HYPERTENSION: ICD-10-CM

## 2020-12-09 DIAGNOSIS — Z23 NEED FOR VACCINATION: ICD-10-CM

## 2020-12-09 PROCEDURE — 90651 9VHPV VACCINE 2/3 DOSE IM: CPT | Performed by: FAMILY MEDICINE

## 2020-12-09 PROCEDURE — 99213 OFFICE O/P EST LOW 20 MIN: CPT | Mod: 25 | Performed by: FAMILY MEDICINE

## 2020-12-09 PROCEDURE — 90471 IMMUNIZATION ADMIN: CPT | Performed by: FAMILY MEDICINE

## 2020-12-09 PROCEDURE — 99395 PREV VISIT EST AGE 18-39: CPT | Mod: 25 | Performed by: FAMILY MEDICINE

## 2020-12-09 RX ORDER — FLUOXETINE 10 MG/1
10 CAPSULE ORAL DAILY
Qty: 30 CAPSULE | Refills: 1 | Status: SHIPPED | OUTPATIENT
Start: 2020-12-09 | End: 2020-12-30

## 2020-12-09 RX ORDER — LOSARTAN POTASSIUM 50 MG/1
50 TABLET ORAL DAILY
Qty: 90 TABLET | Refills: 3 | Status: SHIPPED | OUTPATIENT
Start: 2020-12-09 | End: 2022-02-23

## 2020-12-09 ASSESSMENT — ANXIETY QUESTIONNAIRES
6. BECOMING EASILY ANNOYED OR IRRITABLE: MORE THAN HALF THE DAYS
7. FEELING AFRAID AS IF SOMETHING AWFUL MIGHT HAPPEN: NOT AT ALL
1. FEELING NERVOUS, ANXIOUS, OR ON EDGE: NOT AT ALL
2. NOT BEING ABLE TO STOP OR CONTROL WORRYING: NOT AT ALL
GAD7 TOTAL SCORE: 4
IF YOU CHECKED OFF ANY PROBLEMS ON THIS QUESTIONNAIRE, HOW DIFFICULT HAVE THESE PROBLEMS MADE IT FOR YOU TO DO YOUR WORK, TAKE CARE OF THINGS AT HOME, OR GET ALONG WITH OTHER PEOPLE: SOMEWHAT DIFFICULT
5. BEING SO RESTLESS THAT IT IS HARD TO SIT STILL: NOT AT ALL
3. WORRYING TOO MUCH ABOUT DIFFERENT THINGS: SEVERAL DAYS

## 2020-12-09 ASSESSMENT — MIFFLIN-ST. JEOR: SCORE: 1950.61

## 2020-12-09 ASSESSMENT — PATIENT HEALTH QUESTIONNAIRE - PHQ9
5. POOR APPETITE OR OVEREATING: SEVERAL DAYS
SUM OF ALL RESPONSES TO PHQ QUESTIONS 1-9: 8

## 2020-12-09 NOTE — PROGRESS NOTES
SUBJECTIVE:   CC: Ovidio Burns is an 24 year old male who presents for preventative health visit.     Chief Complaint   Patient presents with     Physical     Discuss pneumo and hpv with pt. Patient is not fasting. Denied Flu shot.      Patient has been advised of split billing requirements and indicates understanding: Yes  Healthy Habits:    Getting at least 3 servings of Calcium per day:  Yes    Bi-annual eye exam:  Yes    Dental care twice a year:  Yes    Sleep apnea or symptoms of sleep apnea:  None    Diet:  Regular (no restrictions)    Frequency of exercise:  4-5 days/week    Duration of exercise:  45-60 minutes    Taking medications regularly:  Yes    Barriers to taking medications:  Problems remembering to take them    Medication side effects:  None    PHQ-2 Total Score:    Additional concerns today:  No      Patient has mood changes.  Not bad.  Has lack of motivation.  He states when he is doing things with friends loses interest.  Reviewed phq9 and gad7, noted 8 and 4 respectively    HTN; he has been taking med, no side effects, feeling good, not checking bp regularly.    Today's PHQ-2 Score: No flowsheet data found.    Abuse: Current or Past(Physical, Sexual or Emotional)- No  Do you feel safe in your environment? Yes        Social History     Tobacco Use     Smoking status: Former Smoker     Types: Dip, chew, snus or snuff     Smokeless tobacco: Current User     Types: Chew     Tobacco comment: chews-tin last 1-2 days   Substance Use Topics     Alcohol use: Yes     Comment: 4 drinks per week     If you drink alcohol do you typically have >3 drinks per day or >7 drinks per week? No    Alcohol Use 12/9/2020   Prescreen: >3 drinks/day or >7 drinks/week? No       Last PSA: No results found for: PSA    Reviewed orders with patient. Reviewed health maintenance and updated orders accordingly - Yes      Reviewed and updated as needed this visit by clinical staff  Tobacco  Allergies  Meds  Problems  Med  "Hx  Surg Hx  Fam Hx  Soc Hx          Reviewed and updated as needed this visit by Provider  Tobacco  Allergies  Meds  Problems  Med Hx  Surg Hx  Fam Hx             Review of Systems  CONSTITUTIONAL: NEGATIVE for fever, chills, change in weight  INTEGUMENTARY/SKIN: NEGATIVE for worrisome rashes, moles or lesions  EYES: NEGATIVE for vision changes or irritation  ENT: NEGATIVE for ear, mouth and throat problems  RESP: NEGATIVE for significant cough or SOB  CV: NEGATIVE for chest pain, palpitations or peripheral edema  GI: NEGATIVE for nausea, abdominal pain, heartburn, or change in bowel habits   male: negative for dysuria, hematuria, decreased urinary stream, erectile dysfunction, urethral discharge  MUSCULOSKELETAL: NEGATIVE for significant arthralgias or myalgia, except intermittent back pain lower that last 3 weeks now resolved  NEURO: NEGATIVE for weakness, dizziness or paresthesias  PSYCHIATRIC: as above    OBJECTIVE:   /70   Pulse 76   Temp 99  F (37.2  C) (Tympanic)   Resp 16   Ht 1.905 m (6' 3\")   Wt 88 kg (194 lb)   SpO2 97%   BMI 24.25 kg/m      Physical Exam  GENERAL: healthy, alert and no distress  EYES: Eyes grossly normal to inspection, PERRL and conjunctivae and sclerae normal  HENT: ear canals and TM's normal, nose and mouth without ulcers or lesions  NECK: no adenopathy, no asymmetry, masses, or scars and thyroid normal to palpation  RESP: lungs clear to auscultation - no rales, rhonchi or wheezes  CV: regular rate and rhythm, normal S1 S2, no S3 or S4, no murmur, click or rub, no peripheral edema and peripheral pulses strong  ABDOMEN: soft, nontender, no hepatosplenomegaly, no masses and bowel sounds normal   (male): normal male genitalia without lesions or urethral discharge, no hernia  MS: no gross musculoskeletal defects noted, no edema  MS: back, no CVA tenderness, has full rom, SLR is negative  SKIN: no suspicious lesions or rashes  NEURO: Normal strength and tone, " "mentation intact and speech normal  PSYCH: mentation appears normal, affect normal/bright  LYMPH: anterior cervical: no adenopathy  posterior cervical: no adenopathy        ASSESSMENT/PLAN:   (Z00.00) Routine general medical examination at a health care facility  (primary encounter diagnosis)  Comment: Discussed healthy lifestyle and preventative cares.    Plan:     (I10) Essential hypertension  Comment: controlled, refilled med, check for side effects  Plan: Basic metabolic panel, losartan (COZAAR) 50 MG         tablet, OFFICE/OUTPT VISIT,EST,LEVL III            (F32.0) Mild major depression (H)  Comment: mild depression, start med, follow up in 3 weeks, call if there are issues, discussed med  Plan: FLUoxetine (PROZAC) 10 MG capsule, OFFICE/OUTPT        VISIT,EST,LEVL III            (Z13.6) CARDIOVASCULAR SCREENING; LDL GOAL LESS THAN 100  Comment:   Plan: Lipid panel reflex to direct LDL Fasting            (Z23) Need for vaccination  Comment:   Plan: HUMAN PAPILLOMA VIRUS (GARDASIL 9) VACCINE         [[id number]]              Patient has been advised of split billing requirements and indicates understanding: Yes  COUNSELING:   Reviewed preventive health counseling, as reflected in patient instructions       Regular exercise       Healthy diet/nutrition       Vision screening       Hearing screening    Estimated body mass index is 24.25 kg/m  as calculated from the following:    Height as of this encounter: 1.905 m (6' 3\").    Weight as of this encounter: 88 kg (194 lb).         He reports that he has quit smoking. His smoking use included dip, chew, snus or snuff. His smokeless tobacco use includes chew.      Counseling Resources:  ATP IV Guidelines  Pooled Cohorts Equation Calculator  FRAX Risk Assessment  ICSI Preventive Guidelines  Dietary Guidelines for Americans, 2010  USDA's MyPlate  ASA Prophylaxis  Lung CA Screening    Alfredito Stephenson MD  [facility]  3  SUBJECTIVE:     "

## 2020-12-09 NOTE — NURSING NOTE
Prior to immunization administration, verified patients identity using patient s name and date of birth. Please see Immunization Activity for additional information.     Screening Questionnaire for Adult Immunization    Are you sick today?   No   Do you have allergies to medications, food, a vaccine component or latex?   No   Have you ever had a serious reaction after receiving a vaccination?   No   Do you have a long-term health problem with heart, lung, kidney, or metabolic disease (e.g., diabetes), asthma, a blood disorder, no spleen, complement component deficiency, a cochlear implant, or a spinal fluid leak?  Are you on long-term aspirin therapy?   No   Do you have cancer, leukemia, HIV/AIDS, or any other immune system problem?   No   Do you have a parent, brother, or sister with an immune system problem?   No   In the past 3 months, have you taken medications that affect  your immune system, such as prednisone, other steroids, or anticancer drugs; drugs for the treatment of rheumatoid arthritis, Crohn s disease, or psoriasis; or have you had radiation treatments?   No   Have you had a seizure, or a brain or other nervous system problem?   No   During the past year, have you received a transfusion of blood or blood    products, or been given immune (gamma) globulin or antiviral drug?   No   For women: Are you pregnant or is there a chance you could become       pregnant during the next month?   No   Have you received any vaccinations in the past 4 weeks?   No     Immunization questionnaire answers were all negative.        Per orders of Dr. shay, injection of hpv given by Taylor Dillon CMA. Patient instructed to remain in clinic for 15 minutes afterwards, and to report any adverse reaction to me immediately.       Screening performed by Taylor Dillon CMA on 12/9/2020 at 3:19 PM.

## 2020-12-09 NOTE — PATIENT INSTRUCTIONS
Please making a fasting lab visit.    I refilled your losartan medication.    For your mood I recommend to start the generic Prozac 10 mg a day.    Follow up in 3 weeks with a virtual visit.    Please be aware that there will be an additional charge during your preventative visit due to either a new diagnosis and/or chronic disease management.    Preventative visits screen for diseases prior to they occur.  They do not cover for any new diagnosis or chronic disease management which would include medication refills, labs etc.    If you have questions regarding your coverage please check with your insurance provider.  At [facility] we need to code correctly to be in compliance with all insurance companies.          Thank you for choosing [facility].  You may be receiving an email and/or telephone survey request from [facility] Customer Experience regarding your visit today.  Please take a few minutes to respond to the survey to let us know how we are doing.      If you have questions or concerns, please contact us via [employer] or you can contact your care team at [phone].    Our Clinic hours are:  Monday 6:40 am  to 7:00 pm  Tuesday -Friday 6:40 am to 5:00 pm    The [place] outpatient lab hours are:  Monday - Friday 6:10 am to 4:45 pm  Saturdays 7:00 am to 11:00 am  Appointments are required, call [phone]    If you have clinical questions after hours or would like to schedule an appointment,  call the clinic at [phone].        Preventive Health Recommendations  Male Ages 21 - 25     Yearly exam:             See your health care provider every year in order to  o   Review health changes.   o   Discuss preventive care.    o   Review your medicines if your doctor has prescribed any.    You should be tested each year for STDs (sexually transmitted diseases).     Talk to your provider about cholesterol testing.      If you are at risk for diabetes, you should have a diabetes test (fasting  glucose).    Shots: Get a flu shot each year. Get a tetanus shot every 10 years.     Nutrition:    Eat at least 5 servings of fruits and vegetables daily.     Eat whole-grain bread, whole-wheat pasta and brown rice instead of white grains and rice.     Get adequate calcium and Vitamin D.     Lifestyle    Exercise for at least 150 minutes a week (30 minutes a day, 5 days a week). This will help you control your weight and prevent disease.     Limit alcohol to one drink per day.     No smoking.     Wear sunscreen to prevent skin cancer.     See your dentist every six months for an exam and cleaning.

## 2020-12-10 ASSESSMENT — ANXIETY QUESTIONNAIRES: GAD7 TOTAL SCORE: 4

## 2020-12-10 ASSESSMENT — ASTHMA QUESTIONNAIRES: ACT_TOTALSCORE: 24

## 2020-12-30 ENCOUNTER — VIRTUAL VISIT (OUTPATIENT)
Dept: FAMILY MEDICINE | Facility: CLINIC | Age: 25
End: 2020-12-30
Payer: COMMERCIAL

## 2020-12-30 DIAGNOSIS — F32.0 MILD MAJOR DEPRESSION (H): ICD-10-CM

## 2020-12-30 PROCEDURE — 99213 OFFICE O/P EST LOW 20 MIN: CPT | Mod: 95 | Performed by: FAMILY MEDICINE

## 2020-12-30 RX ORDER — FLUOXETINE 10 MG/1
10 CAPSULE ORAL DAILY
Qty: 90 CAPSULE | Refills: 3 | Status: SHIPPED | OUTPATIENT
Start: 2020-12-30 | End: 2022-02-23

## 2020-12-30 ASSESSMENT — PATIENT HEALTH QUESTIONNAIRE - PHQ9
SUM OF ALL RESPONSES TO PHQ QUESTIONS 1-9: 3
5. POOR APPETITE OR OVEREATING: SEVERAL DAYS

## 2020-12-30 ASSESSMENT — ANXIETY QUESTIONNAIRES
1. FEELING NERVOUS, ANXIOUS, OR ON EDGE: SEVERAL DAYS
5. BEING SO RESTLESS THAT IT IS HARD TO SIT STILL: NOT AT ALL
GAD7 TOTAL SCORE: 3
3. WORRYING TOO MUCH ABOUT DIFFERENT THINGS: NOT AT ALL
7. FEELING AFRAID AS IF SOMETHING AWFUL MIGHT HAPPEN: NOT AT ALL
2. NOT BEING ABLE TO STOP OR CONTROL WORRYING: NOT AT ALL
IF YOU CHECKED OFF ANY PROBLEMS ON THIS QUESTIONNAIRE, HOW DIFFICULT HAVE THESE PROBLEMS MADE IT FOR YOU TO DO YOUR WORK, TAKE CARE OF THINGS AT HOME, OR GET ALONG WITH OTHER PEOPLE: NOT DIFFICULT AT ALL
6. BECOMING EASILY ANNOYED OR IRRITABLE: SEVERAL DAYS

## 2020-12-30 NOTE — PROGRESS NOTES
"Ovidio Burns is a 25 year old male who is being evaluated via a billable telephone visit.      The patient has been notified of following:     \"This telephone visit will be conducted via a call between you and your physician/provider. We have found that certain health care needs can be provided without the need for a physical exam.  This service lets us provide the care you need with a short phone conversation.  If a prescription is necessary we can send it directly to your pharmacy.  If lab work is needed we can place an order for that and you can then stop by our lab to have the test done at a later time.    Telephone visits are billed at different rates depending on your insurance coverage. During this emergency period, for some insurers they may be billed the same as an in-person visit.  Please reach out to your insurance provider with any questions.    If during the course of the call the physician/provider feels a telephone visit is not appropriate, you will not be charged for this service.\"    Patient has given verbal consent for Telephone visit?  Yes    What phone number would you like to be contacted at? [phone]    How would you like to obtain your AVS? Mail a copy    Subjective     Ovidio Burns is a 25 year old male who presents via phone visit today for the following health issues:    HPI     Depression and Anxiety Follow-Up    How are you doing with your depression since your last visit? Improved, irrability     How are you doing with your anxiety since your last visit?  No change    Are you having other symptoms that might be associated with depression or anxiety? Yes:  insomnia, hasnt gotten worse    Have you had a significant life event? No     Do you have any concerns with your use of alcohol or other drugs? No    Social History     Tobacco Use     Smoking status: Former Smoker     Types: Dip, chew, snus or snuff     Smokeless tobacco: Current User     Types: Chew     Tobacco " comment: chews-tin last 1-2 days   Substance Use Topics     Alcohol use: Yes     Comment: 4 drinks per week     Drug use: No     PHQ 8/5/2019 12/9/2020 12/30/2020   PHQ-9 Total Score 3 8 3   Q9: Thoughts of better off dead/self-harm past 2 weeks Not at all Not at all Not at all     CHRISTOPHER-7 SCORE 8/5/2019 12/9/2020 12/30/2020   Total Score - - -   Total Score 3 4 3     Patient is doing well regarding his depression.  He does not know if it is due to the season or the medication.  No side effect of the medication.  He is taking only 10 mg of the prozac medication.        Suicide Assessment Five-step Evaluation and Treatment (SAFE-T)      How many servings of fruits and vegetables do you eat daily?  4 or more    On average, how many sweetened beverages do you drink each day (Examples: soda, juice, sweet tea, etc.  Do NOT count diet or artificially sweetened beverages)?   0    How many days per week do you exercise enough to make your heart beat faster? 5    How many minutes a day do you exercise enough to make your heart beat faster? 30 - 60    How many days per week do you miss taking your medication? 0             Review of Systems   Review Of Systems  Skin:   Eyes:   Ears/Nose/Throat:   Respiratory:   Cardiovascular:   Gastrointestinal: negative  Genitourinary: negative  Musculoskeletal:   Neurologic:   Psychiatric: as above  Hematologic/Lymphatic/Immunologic:   Endocrine:          Objective          Vitals:  No vitals were obtained today due to virtual visit.    healthy, alert and no distress  PSYCH: Alert and oriented times 3; coherent speech, normal   rate and volume, able to articulate logical thoughts, able   to abstract reason, no tangential thoughts, no hallucinations   or delusions  His affect is normal  RESP: No cough, no audible wheezing, able to talk in full sentences  Remainder of exam unable to be completed due to telephone visits            Assessment/Plan:    Assessment & Plan     Mild major depression  (H)  Seems to have had a good response on low dose of his medication, reviewed phq9 and gad7, will continue with his dose, refilled medication, likely CMA will follow up with him in 6 months or so, discussed going off the medication is also easy to do in the future and this was reviewed.  - FLUoxetine (PROZAC) 10 MG capsule; Take 1 capsule (10 mg) by mouth daily        See Patient Instructions    Return in about 6 months (around 6/30/2021) for If not better.    Alfredito Stephenson MD  [facility]    Phone call duration:  5 minutes

## 2020-12-31 ASSESSMENT — ANXIETY QUESTIONNAIRES: GAD7 TOTAL SCORE: 3

## 2020-12-31 NOTE — PATIENT INSTRUCTIONS
Esme Nolan,  You are doing well!  I recommend to continue with the prozac/fluoxetine medication 10 mg a day.  If you are doing well in 6 months, I recommend to stop the medication to see if you need it any more.  Sincerely,  Alfredito Stephenson MD            Thank you for choosing [facility].  You may be receiving an email and/or telephone survey request from [facility] Customer Experience regarding your visit today.  Please take a few minutes to respond to the survey to let us know how we are doing.      If you have questions or concerns, please contact us via [employer] or you can contact your care team at [phone].    Our Clinic hours are:  Monday 6:40 am  to 7:00 pm  Tuesday -Friday 6:40 am to 5:00 pm    The [place] outpatient lab hours are:  Monday - Friday 6:10 am to 4:45 pm  Saturdays 7:00 am to 11:00 am  Appointments are required, call [phone]    If you have clinical questions after hours or would like to schedule an appointment,  call the clinic at [phone].

## 2021-11-10 ENCOUNTER — NURSE TRIAGE (OUTPATIENT)
Dept: FAMILY MEDICINE | Facility: CLINIC | Age: 26
End: 2021-11-10
Payer: COMMERCIAL

## 2021-11-10 NOTE — TELEPHONE ENCOUNTER
"S-(situation): chest pain     B-(background): per pt, CP started yesterday jorge, located over left chest/heart. Also states irregular HR.  Pt has large family hx of stroke, heart attack. Pt has hx irregular heartbeat \"comes & goes\" , HTN & uses chewing tobacco. Pt seen in ER for this in past. Last visit 4/2020.    A-(assessment): pt states chest pain does not radiate, contained to left chest. Pain is constant & \"tight\", when gets to bottom of breath \"feels like there is a weight on my chest\". Rates moderate pain, states can do things \"but wouldn't want to go for a jog\". Denies SOA, sweating, dizziness.  States HR 's all day, feels like it \"skips a beat\" & ECG on watch notes this happens around 1x/10minutes.    R-(recommendations): protocol advises ER. Pt states he has been seen for this before without finding much. Pt told due to his family cardiac hx & his hx of HTN & symptoms not resolving he needs to be evaluated. Pt states he will be seen.      Reason for Disposition    Chest pain lasting longer than 5 minutes and occurred in last 3 days (72 hours) (Exception: feels exactly the same as previously diagnosed heartburn and has accompanying sour taste in mouth)    Heart beating irregularly or very rapidly     Has had irregular HR in past \"feels like it skips beats\"  Rate today     Additional Information    Negative: Major surgery in the past month    Answer Assessment - Initial Assessment Questions  1. LOCATION: \"Where does it hurt?\"        Left chest pain, \"over heart\"  2. RADIATION: \"Does the pain go anywhere else?\" (e.g., into neck, jaw, arms, back)      No radiation  3. ONSET: \"When did the chest pain begin?\" (Minutes, hours or days)       Yesterday jorge, worse this morning  4. PATTERN \"Does the pain come and go, or has it been constant since it started?\"  \"Does it get worse with exertion?\"       Constant, tight. Every once in awhile tight enough that it makes pt wince  5. DURATION: \"How long does it " "last\" (e.g., seconds, minutes, hours)      Constant with periods of increased intensity  6. SEVERITY: \"How bad is the pain?\"  (e.g., Scale 1-10; mild, moderate, or severe)     - MILD (1-3): doesn't interfere with normal activities      - MODERATE (4-7): interferes with normal activities or awakens from sleep     - SEVERE (8-10): excruciating pain, unable to do any normal activities        Moderate, can do normal activities but wouln't want to jog   7. CARDIAC RISK FACTORS: \"Do you have any history of heart problems or risk factors for heart disease?\" (e.g., angina, prior heart attack; diabetes, high blood pressure, high cholesterol, smoker, or strong family history of heart disease)       personal hx of irregular heartbeat, this comes & goes, high blood pressure, uses chewing tobacco  Pt also has fam hx stroke, heart attack  8. PULMONARY RISK FACTORS: \"Do you have any history of lung disease?\"  (e.g., blood clots in lung, asthma, emphysema, birth control pills)      Brother has asthma  9. CAUSE: \"What do you think is causing the chest pain?\"      dont know  10. OTHER SYMPTOMS: \"Do you have any other symptoms?\" (e.g., dizziness, nausea, vomiting, sweating, fever, difficulty breathing, cough)        none  11. PREGNANCY: \"Is there any chance you are pregnant?\" \"When was your last menstrual period?\"        N/a/    Protocols used: CHEST PAIN-A-[place]      "

## 2021-11-26 ENCOUNTER — HOSPITAL ENCOUNTER (EMERGENCY)
Facility: CLINIC | Age: 26
Discharge: LEFT BEFORE DISCHARGE | End: 2021-11-26
Admitting: EMERGENCY MEDICINE
Payer: COMMERCIAL

## 2021-11-26 VITALS
RESPIRATION RATE: 18 BRPM | WEIGHT: 194 LBS | HEART RATE: 94 BPM | SYSTOLIC BLOOD PRESSURE: 161 MMHG | BODY MASS INDEX: 24.25 KG/M2 | TEMPERATURE: 97.5 F | DIASTOLIC BLOOD PRESSURE: 99 MMHG | OXYGEN SATURATION: 100 %

## 2021-11-26 LAB
ATRIAL RATE - MUSE: 93 BPM
DIASTOLIC BLOOD PRESSURE - MUSE: NORMAL MMHG
INTERPRETATION ECG - MUSE: NORMAL
P AXIS - MUSE: 80 DEGREES
PR INTERVAL - MUSE: 154 MS
QRS DURATION - MUSE: 86 MS
QT - MUSE: 336 MS
QTC - MUSE: 417 MS
R AXIS - MUSE: 39 DEGREES
SYSTOLIC BLOOD PRESSURE - MUSE: NORMAL MMHG
T AXIS - MUSE: 59 DEGREES
VENTRICULAR RATE- MUSE: 93 BPM

## 2021-11-26 PROCEDURE — 93005 ELECTROCARDIOGRAM TRACING: CPT | Performed by: EMERGENCY MEDICINE

## 2021-11-26 PROCEDURE — 999N000104 HC STATISTIC NO CHARGE

## 2021-11-26 NOTE — ED TRIAGE NOTES
Pt felt his heart race and have palpitations while getting ready to eat breakfast today about 25 minutes ago. Pt has Apple watch that showed his heart rate was up to 130's. Pt has history of this and has been seen before in [place]. Last time pt had chest pain and received ntg. Denies chest pain now. EKG done in triage and shown to Dr Carmichael. Heart rate at this time in the 90's and NSR. Pt on Losartan and fluoxetine.

## 2021-12-04 ENCOUNTER — TRANSFERRED RECORDS (OUTPATIENT)
Dept: HEALTH INFORMATION MANAGEMENT | Facility: CLINIC | Age: 26
End: 2021-12-04
Payer: COMMERCIAL

## 2022-01-02 ENCOUNTER — HEALTH MAINTENANCE LETTER (OUTPATIENT)
Age: 27
End: 2022-01-02

## 2022-01-06 ENCOUNTER — TELEPHONE (OUTPATIENT)
Dept: FAMILY MEDICINE | Facility: CLINIC | Age: 27
End: 2022-01-06
Payer: COMMERCIAL

## 2022-01-06 NOTE — TELEPHONE ENCOUNTER
Reason for call:    Symptom or request:     Patient called stating that he was diagnosed with covid via home test 12/28 positive and retested on 12/31- tested again. Both positive. He is leaving for [place]  01/25-01/30; he is asking for a letter to state he had covid.  For reentry to [place] after vacation.    I explained to patient appt is needed, scheduled a tentive appt please confirm this is correct.     Best Time:  any    Can we leave a detailed message on this number?  YES     Jaclyn HARRIS  Station

## 2022-01-10 NOTE — TELEPHONE ENCOUNTER
I called patient to remind him he could test positive for Covid 19 for a while.   I told him to contact his international airline to find out what their rules are for travelers to [place].

## 2022-01-11 ENCOUNTER — VIRTUAL VISIT (OUTPATIENT)
Dept: FAMILY MEDICINE | Facility: CLINIC | Age: 27
End: 2022-01-11
Payer: COMMERCIAL

## 2022-01-11 DIAGNOSIS — U07.1 INFECTION DUE TO 2019 NOVEL CORONAVIRUS: Primary | ICD-10-CM

## 2022-01-11 PROCEDURE — 99212 OFFICE O/P EST SF 10 MIN: CPT | Mod: 95 | Performed by: FAMILY MEDICINE

## 2022-01-11 NOTE — LETTER
[facility]  [address] [address]  Phone: [phone]         January 11, 2022      To whom it may concern,    Mr. Ovidio Burns was diagnosed with COVID19 on 12/28/2021 when he took a home test due to symptoms of cough, fever and myalgias.  He was seen today virtually.  He has since completely recovered and follow up negative COVID19 test.  He is over 10 days when his symptoms started and is currently asymptotic.  He has recovered from COVID19 and can travel.      Sincerely,        Alfredito Stephenson MD

## 2022-01-11 NOTE — PATIENT INSTRUCTIONS
Needed letter to travel since he had a positive COVID19 test which was antigen.    Subsequent test was negative.    Wrote letter stating he can travel.          Thank you for choosing [facility] (formerly [facility]3].  You may be receiving an email and/or telephone survey request from [facility] Customer Experience regarding your visit today.  Please take a few minutes to respond to the survey to let us know how we are doing.      If you have questions or concerns, please contact us via noodls or you can contact your care team at [phone] option 2.    Our Clinic hours are:  Monday - Thursday 7am-6pm  Friday 7am-5pm    The [place] outpatient lab hours are:  Monday - Friday 7am-4:30pm    Appointments are required, call [phone]    If you have clinical questions after hours or would like to schedule an appointment,  call the clinic at [phone].

## 2022-01-11 NOTE — PROGRESS NOTES
Ovidio is a 26 year old who is being evaluated via a billable telephone visit.      What phone number would you like to be contacted at? [phone]  How would you like to obtain your AVS? MyChart    Assessment & Plan     Infection due to 2019 novel coronavirus  He became symptomatic on 12/28/2021 with fever chills, cough and body aches.  Took home covid test and it was positive.  It was an antigen test.  He subsequently recovered and took a second covid test which was negative.  It was 7 days after symptoms he was symptomatic.  No residual symptoms.  He is feeling fine. Wrote a letter for travel.  Recommend to get covid vaccine 90 days after initial diagnosis.               See Patient Instructions    Return in about 1 month (around 2/11/2022) for Office Visit.    Alfredito Stephenson MD  [facility]    Chikis Nolan is a 26 year old who presents for the following health issues     HPI     Chief Complaint   Patient presents with     Letter Request     [employer] letter request. Website states that Ovidio needs a letter from a licensed physician to travel after recovery from positive covid test in the 90 days.             Review of Systems   No symptoms.  No fever or chills.  No body aches.  No cough.  No sob or rojas.      Objective           Vitals:  No vitals were obtained today due to virtual visit.    Physical Exam   healthy, alert and no distress  PSYCH: Alert and oriented times 3; coherent speech, normal   rate and volume, able to articulate logical thoughts, able   to abstract reason, no tangential thoughts, no hallucinations   or delusions  His affect is normal  RESP: No cough, no audible wheezing, able to talk in full sentences  Remainder of exam unable to be completed due to telephone visits                Phone call duration: 7 minutes

## 2022-02-23 ENCOUNTER — OFFICE VISIT (OUTPATIENT)
Dept: FAMILY MEDICINE | Facility: CLINIC | Age: 27
End: 2022-02-23
Payer: COMMERCIAL

## 2022-02-23 VITALS
HEIGHT: 75 IN | HEART RATE: 84 BPM | OXYGEN SATURATION: 98 % | SYSTOLIC BLOOD PRESSURE: 111 MMHG | TEMPERATURE: 99 F | BODY MASS INDEX: 25.15 KG/M2 | RESPIRATION RATE: 16 BRPM | WEIGHT: 202.25 LBS | DIASTOLIC BLOOD PRESSURE: 79 MMHG

## 2022-02-23 DIAGNOSIS — Z00.00 ROUTINE GENERAL MEDICAL EXAMINATION AT A HEALTH CARE FACILITY: Primary | ICD-10-CM

## 2022-02-23 DIAGNOSIS — R00.0 TACHYCARDIA: ICD-10-CM

## 2022-02-23 DIAGNOSIS — R68.82 DECREASED LIBIDO: ICD-10-CM

## 2022-02-23 DIAGNOSIS — F32.0 MILD MAJOR DEPRESSION (H): ICD-10-CM

## 2022-02-23 DIAGNOSIS — I10 ESSENTIAL HYPERTENSION: ICD-10-CM

## 2022-02-23 DIAGNOSIS — F41.9 ANXIETY: ICD-10-CM

## 2022-02-23 PROCEDURE — 99214 OFFICE O/P EST MOD 30 MIN: CPT | Mod: 25 | Performed by: FAMILY MEDICINE

## 2022-02-23 PROCEDURE — 99395 PREV VISIT EST AGE 18-39: CPT | Performed by: FAMILY MEDICINE

## 2022-02-23 RX ORDER — CITALOPRAM HYDROBROMIDE 10 MG/1
10 TABLET ORAL DAILY
Qty: 30 TABLET | Refills: 1 | Status: SHIPPED | OUTPATIENT
Start: 2022-02-23 | End: 2022-03-16

## 2022-02-23 RX ORDER — LOSARTAN POTASSIUM 50 MG/1
50 TABLET ORAL DAILY
Qty: 90 TABLET | Refills: 3 | Status: SHIPPED | OUTPATIENT
Start: 2022-02-23 | End: 2023-03-29 | Stop reason: DRUGHIGH

## 2022-02-23 ASSESSMENT — ENCOUNTER SYMPTOMS
HEMATOCHEZIA: 0
COUGH: 0
FREQUENCY: 0
DYSURIA: 0
DIARRHEA: 0
CHILLS: 0
JOINT SWELLING: 0
DIZZINESS: 0
SORE THROAT: 0
NERVOUS/ANXIOUS: 1
HEMATURIA: 0
ARTHRALGIAS: 0
SHORTNESS OF BREATH: 1
FEVER: 0
MYALGIAS: 0
ABDOMINAL PAIN: 0
WEAKNESS: 0
NAUSEA: 0
PALPITATIONS: 1
HEARTBURN: 0
CONSTIPATION: 0
HEADACHES: 1
PARESTHESIAS: 1
EYE PAIN: 0

## 2022-02-23 ASSESSMENT — ANXIETY QUESTIONNAIRES
7. FEELING AFRAID AS IF SOMETHING AWFUL MIGHT HAPPEN: SEVERAL DAYS
5. BEING SO RESTLESS THAT IT IS HARD TO SIT STILL: SEVERAL DAYS
GAD7 TOTAL SCORE: 10
2. NOT BEING ABLE TO STOP OR CONTROL WORRYING: MORE THAN HALF THE DAYS
3. WORRYING TOO MUCH ABOUT DIFFERENT THINGS: MORE THAN HALF THE DAYS
6. BECOMING EASILY ANNOYED OR IRRITABLE: SEVERAL DAYS
7. FEELING AFRAID AS IF SOMETHING AWFUL MIGHT HAPPEN: SEVERAL DAYS
1. FEELING NERVOUS, ANXIOUS, OR ON EDGE: MORE THAN HALF THE DAYS
4. TROUBLE RELAXING: SEVERAL DAYS
GAD7 TOTAL SCORE: 10
GAD7 TOTAL SCORE: 10

## 2022-02-23 ASSESSMENT — PATIENT HEALTH QUESTIONNAIRE - PHQ9
SUM OF ALL RESPONSES TO PHQ QUESTIONS 1-9: 6
10. IF YOU CHECKED OFF ANY PROBLEMS, HOW DIFFICULT HAVE THESE PROBLEMS MADE IT FOR YOU TO DO YOUR WORK, TAKE CARE OF THINGS AT HOME, OR GET ALONG WITH OTHER PEOPLE: SOMEWHAT DIFFICULT
SUM OF ALL RESPONSES TO PHQ QUESTIONS 1-9: 6

## 2022-02-23 NOTE — PATIENT INSTRUCTIONS
Please go to lab today to get your urine jug.    Please stop the fluoxetine medication.    Please start the citalopram medication 10 mg a day.    Follow up virtually in 3 weeks.    Please get your labs done.    Please be aware that there will be an additional charge during your preventative visit due to either a new diagnosis and/or chronic disease management.    Preventative visits screen for diseases prior to they occur.  They do not cover for any new diagnosis or chronic disease management which would include medication refills, labs etc.    If you have questions regarding your coverage please check with your insurance provider.  At [facility] we need to code correctly to be in compliance with all insurance companies.          Thank you for choosing [facility].  You may be receiving an email and/or telephone survey request from [facility] Customer Experience regarding your visit today.  Please take a few minutes to respond to the survey to let us know how we are doing.      If you have questions or concerns, please contact us via Legal Egg or you can contact your care team at [phone] option 2.    Our Clinic hours are:  Monday - Thursday 7am-6pm  Friday 7am-5pm    The [place] outpatient lab hours are:  Monday - Friday 7am-4:30pm    Appointments are required, call [phone]    If you have clinical questions after hours or would like to schedule an appointment,  call the clinic at [phone].    Preventive Health Recommendations  Male Ages 26 - 39    Yearly exam:             See your health care provider every year in order to  o   Review health changes.   o   Discuss preventive care.    o   Review your medicines if your doctor has prescribed any.    You should be tested each year for STDs (sexually transmitted diseases), if you re at risk.     After age 35, talk to your provider about cholesterol testing. If you are at risk for heart disease, have your cholesterol tested at least every 5 years.     If  you are at risk for diabetes, you should have a diabetes test (fasting glucose).  Shots: Get a flu shot each year. Get a tetanus shot every 10 years.     Nutrition:    Eat at least 5 servings of fruits and vegetables daily.     Eat whole-grain bread, whole-wheat pasta and brown rice instead of white grains and rice.     Get adequate Calcium and Vitamin D.     Lifestyle    Exercise for at least 150 minutes a week (30 minutes a day, 5 days a week). This will help you control your weight and prevent disease.     Limit alcohol to one drink per day.     No smoking.     Wear sunscreen to prevent skin cancer.     See your dentist every six months for an exam and cleaning.

## 2022-02-23 NOTE — PROGRESS NOTES
SUBJECTIVE:   CC: Ovidio Burns is an 26 year old male who presents for preventative health visit. Answers for HPI/ROS submitted by the patient on 2/23/2022  If you checked off any problems, how difficult have these problems made it for you to do your work, take care of things at home, or get along with other people?: Somewhat difficult  PHQ9 TOTAL SCORE: 6  CHRISTOPHER 7 TOTAL SCORE: 10    Healthy Habits:     Getting at least 3 servings of Calcium per day:  Yes    Bi-annual eye exam:  Yes    Dental care twice a year:  Yes    Sleep apnea or symptoms of sleep apnea:  Daytime drowsiness    Diet:  Regular (no restrictions)    Frequency of exercise:  4-5 days/week    Duration of exercise:  30-45 minutes    Taking medications regularly:  Yes    Medication side effects:  None    PHQ-2 Total Score: 1    Additional concerns today:  Yes    Copied recent cholesterol result to be abstracted into chart, good result.  Reviewed outside lab    Hypertension Follow-up      Do you check your blood pressure regularly outside of the clinic? Yes     Are you following a low salt diet? Yes, better at not adding salt.    Are your blood pressures ever more than 140 on the top number (systolic) OR more   than 90 on the bottom number (diastolic), for example 140/90? No - 110-120/70-80  Controlled.  Depression and Anxiety Follow-Up    How are you doing with your depression since your last visit? Improved - does not feel he has any depression.    How are you doing with your anxiety since your last visit?  Worsened - not sure if anxiety is affecting his heart pain along with irregular heartbeat or vice versa.    Are you having other symptoms that might be associated with depression or anxiety? Yes:  - feels disassociated with what's happening around him. Feels like he could black out or faint.    Have you had a significant life event? No , nothing negative    Do you have any concerns with your use of alcohol or other drugs? No    Having episodes of  chest pain, tachycardia, feeling sweats. Globus sensation.  This is new from one year ago.  He states depression is not much of an issue.  He has good relationships, no financial issues, no stress at work that is unusual.  Health is good other than these attacks.  Feels fast heart rate and forearm are flushed.    Social History     Tobacco Use     Smoking status: Former Smoker     Types: Dip, chew, snus or snuff     Smokeless tobacco: Current User     Types: Chew     Tobacco comment: chews-tin last 1-2 days   Vaping Use     Vaping Use: Never used   Substance Use Topics     Alcohol use: Yes     Comment: 4 drinks per week     Drug use: No     PHQ 12/9/2020 12/30/2020 2/23/2022   PHQ-9 Total Score 8 3 6   Q9: Thoughts of better off dead/self-harm past 2 weeks Not at all Not at all Not at all     CHRISTOPHER-7 SCORE 12/9/2020 12/30/2020 2/23/2022   Total Score - - -   Total Score - - 10 (moderate anxiety)   Total Score 4 3 10     Last PHQ-9 2/23/2022   1.  Little interest or pleasure in doing things 1   2.  Feeling down, depressed, or hopeless 0   3.  Trouble falling or staying asleep, or sleeping too much 2   4.  Feeling tired or having little energy 1   5.  Poor appetite or overeating 1   6.  Feeling bad about yourself 0   7.  Trouble concentrating 0   8.  Moving slowly or restless 1   Q9: Thoughts of better off dead/self-harm past 2 weeks 0   PHQ-9 Total Score 6   Difficulty at work, home, or with people -     CHRISTOPHER-7  2/23/2022   1. Feeling nervous, anxious, or on edge 2   2. Not being able to stop or control worrying 2   3. Worrying too much about different things 2   4. Trouble relaxing 1   5. Being so restless that it is hard to sit still 1   6. Becoming easily annoyed or irritable 1   7. Feeling afraid, as if something awful might happen 1   CHRISTOPHER-7 Total Score 10   If you checked any problems, how difficult have they made it for you to do your work, take care of things at home, or get along with other people? -        Suicide Assessment Five-step Evaluation and Treatment (SAFE-T)      Today's PHQ-2 Score:   PHQ-2 ( 1999 Pfizer) 2/23/2022   Q1: Little interest or pleasure in doing things 1   Q2: Feeling down, depressed or hopeless 0   PHQ-2 Score 1   Q1: Little interest or pleasure in doing things Several days   Q2: Feeling down, depressed or hopeless Not at all   PHQ-2 Score 1       Abuse: Current or Past(Physical, Sexual or Emotional)- No  Do you feel safe in your environment? Yes    Have you ever done Advance Care Planning? (For example, a Health Directive, POLST, or a discussion with a medical provider or your loved ones about your wishes): No, advance care planning information given to patient to review.  Patient plans to discuss their wishes with loved ones or provider.      Social History     Tobacco Use     Smoking status: Former Smoker     Types: Dip, chew, snus or snuff     Smokeless tobacco: Current User     Types: Chew     Tobacco comment: chews-tin last 1-2 days   Substance Use Topics     Alcohol use: Yes     Comment: 4 drinks per week       Alcohol Use 2/23/2022   Prescreen: >3 drinks/day or >7 drinks/week? No   Prescreen: >3 drinks/day or >7 drinks/week? -     Last PSA: No results found for: PSA    Reviewed orders with patient. Reviewed health maintenance and updated orders accordingly - Yes      Reviewed and updated as needed this visit by clinical staff   Tobacco  Allergies  Meds   Med Hx  Surg Hx  Fam Hx  Soc Hx        Reviewed and updated as needed this visit by Provider                     Review of Systems   Constitutional: Negative for chills and fever.   HENT: Negative for congestion, ear pain, hearing loss and sore throat.    Eyes: Negative for pain and visual disturbance.   Respiratory: Positive for shortness of breath. Negative for cough.    Cardiovascular: Positive for chest pain and palpitations. Negative for peripheral edema.   Gastrointestinal: Negative for abdominal pain, constipation,  "diarrhea, heartburn, hematochezia and nausea.   Genitourinary: Negative for dysuria, frequency, genital sores, hematuria, impotence, penile discharge and urgency.   Musculoskeletal: Negative for arthralgias, joint swelling and myalgias.   Skin: Negative for rash.   Neurological: Positive for headaches and paresthesias. Negative for dizziness and weakness.   Psychiatric/Behavioral: Negative for mood changes. The patient is nervous/anxious.          OBJECTIVE:   /79   Pulse 84   Temp 99  F (37.2  C) (Tympanic)   Resp 16   Ht 1.904 m (6' 2.96\")   Wt 91.7 kg (202 lb 4 oz)   SpO2 98%   BMI 25.31 kg/m      Physical Exam  GENERAL: healthy, alert and no distress  EYES: Eyes grossly normal to inspection, PERRL and conjunctivae and sclerae normal  HENT: ear canals and TM's normal, nose and mouth without ulcers or lesions  NECK: no adenopathy, no asymmetry, masses, or scars and thyroid normal to palpation  RESP: lungs clear to auscultation - no rales, rhonchi or wheezes  CV: regular rate and rhythm, normal S1 S2, no S3 or S4, no murmur, click or rub, no peripheral edema and peripheral pulses strong  ABDOMEN: soft, nontender, no hepatosplenomegaly, no masses and bowel sounds normal   (male): normal male genitalia without lesions or urethral discharge, no hernia  MS: no gross musculoskeletal defects noted, no edema  SKIN: no suspicious lesions or rashes  NEURO: Normal strength and tone, mentation intact and speech normal  PSYCH: mentation appears normal, affect normal/bright  LYMPH: no cervical adenopathy        ASSESSMENT/PLAN:   (Z00.00) Routine general medical examination at a health care facility  (primary encounter diagnosis)  Comment: Discussed healthy lifestyle and preventative cares.    Plan:     (I10) Essential hypertension  Comment: controlled and refilled med  Plan: losartan (COZAAR) 50 MG tablet, Basic metabolic        panel, OFFICE/OUTPT VISIT,EST,LEVL IV            (R68.82) Decreased libido  Comment: " "he has concerns about decrease libido, wants testosterone checked.  Plan: Testosterone, total, OFFICE/OUTPT         VISIT,EST,LEVL IV            (F41.9) Anxiety  Comment: unclear if there could be a pheochromocytoma, will check, however will change ssri, if this does not work and still has issues will go to SNRI medication.    Plan: Catecholamines fractioned free urine,         citalopram (CELEXA) 10 MG tablet, OFFICE/OUTPT         VISIT,EST,LEVL IV        As above    (R00.0) Tachycardia  Comment: unclear check labs  Plan: Catecholamines fractioned free urine,         Metanephrine random or 24 hr urine, TSH with         free T4 reflex, Basic metabolic panel,         OFFICE/OUTPT VISIT,EST,LEVL IV            (F32.0) Mild major depression (H)  Comment: controlled  Plan:     Patient has been advised of split billing requirements and indicates understanding: Yes    COUNSELING:   Reviewed preventive health counseling, as reflected in patient instructions       Regular exercise       Healthy diet/nutrition    Estimated body mass index is 25.31 kg/m  as calculated from the following:    Height as of this encounter: 1.904 m (6' 2.96\").    Weight as of this encounter: 91.7 kg (202 lb 4 oz).         He reports that he has quit smoking. His smoking use included dip, chew, snus or snuff. His smokeless tobacco use includes chew.      Counseling Resources:  ATP IV Guidelines  Pooled Cohorts Equation Calculator  FRAX Risk Assessment  ICSI Preventive Guidelines  Dietary Guidelines for Americans, 2010  USDA's MyPlate  ASA Prophylaxis  Lung CA Screening    Alfredito Stephenson MD  [facility]  "

## 2022-02-24 ASSESSMENT — PATIENT HEALTH QUESTIONNAIRE - PHQ9: SUM OF ALL RESPONSES TO PHQ QUESTIONS 1-9: 6

## 2022-02-24 ASSESSMENT — ANXIETY QUESTIONNAIRES: GAD7 TOTAL SCORE: 10

## 2022-03-08 ENCOUNTER — LAB (OUTPATIENT)
Dept: LAB | Facility: CLINIC | Age: 27
End: 2022-03-08
Payer: COMMERCIAL

## 2022-03-08 DIAGNOSIS — R00.0 TACHYCARDIA: ICD-10-CM

## 2022-03-08 DIAGNOSIS — I10 ESSENTIAL HYPERTENSION: ICD-10-CM

## 2022-03-08 DIAGNOSIS — R68.82 DECREASED LIBIDO: ICD-10-CM

## 2022-03-08 LAB
ANION GAP SERPL CALCULATED.3IONS-SCNC: 5 MMOL/L (ref 3–14)
BUN SERPL-MCNC: 18 MG/DL (ref 7–30)
CALCIUM SERPL-MCNC: 9.3 MG/DL (ref 8.5–10.1)
CHLORIDE BLD-SCNC: 105 MMOL/L (ref 94–109)
CO2 SERPL-SCNC: 26 MMOL/L (ref 20–32)
CREAT SERPL-MCNC: 0.94 MG/DL (ref 0.66–1.25)
GFR SERPL CREATININE-BSD FRML MDRD: >90 ML/MIN/1.73M2
GLUCOSE BLD-MCNC: 87 MG/DL (ref 70–99)
POTASSIUM BLD-SCNC: 3.8 MMOL/L (ref 3.4–5.3)
SODIUM SERPL-SCNC: 136 MMOL/L (ref 133–144)
TSH SERPL DL<=0.005 MIU/L-ACNC: 1.74 MU/L (ref 0.4–4)

## 2022-03-08 PROCEDURE — 36415 COLL VENOUS BLD VENIPUNCTURE: CPT

## 2022-03-08 PROCEDURE — 84403 ASSAY OF TOTAL TESTOSTERONE: CPT

## 2022-03-08 PROCEDURE — 84443 ASSAY THYROID STIM HORMONE: CPT

## 2022-03-08 PROCEDURE — 80048 BASIC METABOLIC PNL TOTAL CA: CPT

## 2022-03-09 LAB — TESTOST SERPL-MCNC: 298 NG/DL (ref 240–950)

## 2022-03-16 ENCOUNTER — VIRTUAL VISIT (OUTPATIENT)
Dept: FAMILY MEDICINE | Facility: CLINIC | Age: 27
End: 2022-03-16
Payer: COMMERCIAL

## 2022-03-16 DIAGNOSIS — F41.9 ANXIETY: ICD-10-CM

## 2022-03-16 PROCEDURE — 99213 OFFICE O/P EST LOW 20 MIN: CPT | Mod: 95 | Performed by: FAMILY MEDICINE

## 2022-03-16 RX ORDER — CITALOPRAM HYDROBROMIDE 10 MG/1
10 TABLET ORAL DAILY
Qty: 90 TABLET | Refills: 3 | Status: SHIPPED | OUTPATIENT
Start: 2022-03-16 | End: 2022-05-10

## 2022-03-16 ASSESSMENT — PAIN SCALES - GENERAL: PAINLEVEL: NO PAIN (0)

## 2022-03-16 ASSESSMENT — ANXIETY QUESTIONNAIRES

## 2022-03-16 ASSESSMENT — PATIENT HEALTH QUESTIONNAIRE - PHQ9
SUM OF ALL RESPONSES TO PHQ QUESTIONS 1-9: 1
5. POOR APPETITE OR OVEREATING: NOT AT ALL

## 2022-03-16 NOTE — PATIENT INSTRUCTIONS
Take the citalopram before bed.    Continue with this medication for 6-9 months before deciding to make any decision about dropping down or going off the medication.          Thank you for choosing [facility].  You may be receiving an email and/or telephone survey request from [facility] Customer Experience regarding your visit today.  Please take a few minutes to respond to the survey to let us know how we are doing.      If you have questions or concerns, please contact us via Lango or you can contact your care team at [phone] option 2.    Our Clinic hours are:  Monday - Thursday 7am-6pm  Friday 7am-5pm    The [place] outpatient lab hours are:  Monday - Friday 7am-4:30pm    Appointments are required, call [phone]    If you have clinical questions after hours or would like to schedule an appointment,  call the clinic at [phone].

## 2022-03-16 NOTE — PROGRESS NOTES
Ovidio is a 26 year old who is being evaluated via a billable telephone visit.      What phone number would you like to be contacted at? [phone]  How would you like to obtain your AVS? MyChart    Assessment & Plan     Anxiety  Marked improvement.  Discussed med.  Discussed he has some issues with insomnia at time.  Suggested to take medication at night.  He will try this.  - citalopram (CELEXA) 10 MG tablet; Take 1 tablet (10 mg) by mouth daily    Insomnia: has tried OTC meds, will switch citalopram to night        Discussed also testosterone level, he will be working out more now that the weather is nicer.     See Patient Instructions    Return in about 6 months (around 9/16/2022) for If not better.    Alfredito Stephenson MD  [facility]    Chikis Nolan is a 26 year old who presents for the following health issues     HPI     Depression and Anxiety Follow-Up    How are you doing with your depression since your last visit? Improved     How are you doing with your anxiety since your last visit?  Improved     Are you having other symptoms that might be associated with depression or anxiety? Yes:  fall asleep, however, this has been an ongoing issue    Have you had a significant life event? No     Do you have any concerns with your use of alcohol or other drugs? No    Social History     Tobacco Use     Smoking status: Former Smoker     Types: Dip, chew, snus or snuff     Smokeless tobacco: Former User     Types: Chew     Quit date: 3/8/2022   Vaping Use     Vaping Use: Never used   Substance Use Topics     Alcohol use: Yes     Comment: 4 drinks per week     Drug use: No     PHQ 12/30/2020 2/23/2022 3/16/2022   PHQ-9 Total Score 3 6 1   Q9: Thoughts of better off dead/self-harm past 2 weeks Not at all Not at all Not at all     CHRISTOPHER-7 SCORE 12/30/2020 2/23/2022 3/16/2022   Total Score - - -   Total Score - 10 (moderate anxiety) -   Total Score 3 10 0     Last PHQ-9 3/16/2022   1.  [place]  interest or pleasure in doing things 0   2.  Feeling down, depressed, or hopeless 0   3.  Trouble falling or staying asleep, or sleeping too much 1   4.  Feeling tired or having little energy 0   5.  Poor appetite or overeating 0   6.  Feeling bad about yourself 0   7.  Trouble concentrating 0   8.  Moving slowly or restless 0   Q9: Thoughts of better off dead/self-harm past 2 weeks 0   PHQ-9 Total Score 1   Difficulty at work, home, or with people Not difficult at all     CHRISTOPHER-7  3/16/2022   1. Feeling nervous, anxious, or on edge 0   2. Not being able to stop or control worrying 0   3. Worrying too much about different things 0   4. Trouble relaxing 0   5. Being so restless that it is hard to sit still 0   6. Becoming easily annoyed or irritable 0   7. Feeling afraid, as if something awful might happen 0   CHRISTOPHER-7 Total Score 0   If you checked any problems, how difficult have they made it for you to do your work, take care of things at home, or get along with other people? Not difficult at all       Suicide Assessment Five-step Evaluation and Treatment (SAFE-T)          Review of Systems         Objective    Vitals - Patient Reported  Pain Score: No Pain (0)      Vitals:  No vitals were obtained today due to virtual visit.    Physical Exam   healthy, alert and no distress  PSYCH: Alert and oriented times 3; coherent speech, normal   rate and volume, able to articulate logical thoughts, able   to abstract reason, no tangential thoughts, no hallucinations   or delusions  His affect is normal  RESP: No cough, no audible wheezing, able to talk in full sentences  Remainder of exam unable to be completed due to telephone visits                Phone call duration: 8 minutes

## 2022-03-17 ASSESSMENT — ANXIETY QUESTIONNAIRES: GAD7 TOTAL SCORE: 0

## 2022-08-19 DIAGNOSIS — F41.9 ANXIETY: ICD-10-CM

## 2022-08-19 RX ORDER — CITALOPRAM HYDROBROMIDE 10 MG/1
10 TABLET ORAL DAILY
Qty: 90 TABLET | Refills: 0 | Status: SHIPPED | OUTPATIENT
Start: 2022-08-19 | End: 2022-12-07

## 2022-08-19 NOTE — TELEPHONE ENCOUNTER
Reason for Call:  Medication or medication refill:  Citalopram 10MG tablet  Do you use a [facility]?  Name of the pharmacy and phone number for the current request:  [facility]  Name of the medication requested: citralopram 10 mg tablet  Pt requesting a 90 day refill instead of 30 day please  Can we leave a detailed message on this number? YES    Phone number patient can be reached at: Home number on file [phone] (home)    Best Time: any    Call taken on 8/19/2022 at 11:39 AM by Yvette Morrison

## 2022-10-12 ENCOUNTER — TELEPHONE (OUTPATIENT)
Dept: FAMILY MEDICINE | Facility: CLINIC | Age: 27
End: 2022-10-12

## 2022-10-12 NOTE — TELEPHONE ENCOUNTER
Dr. Stephenson,    Patient calls stating he is trying to get long term disability insurance.  The insurance company states that he never completed the testing for pheochromocytoma.  Would this be the urine for metanephrine and or catecholamines?  The only 2 open lab orders I see? Please advise. eLonor ALVAREZ RN

## 2022-10-13 NOTE — TELEPHONE ENCOUNTER
Pt was notified, and verbalized understanding. Transferred to scheduling for lab appointment.    Paulette Berger RN  [facility]

## 2022-10-13 NOTE — TELEPHONE ENCOUNTER
Those were the two tests we discussed.  They are ordered as future.  If the insurance states he needs to complete the work up, please have him do the labs.  Alfredito Stephenson MD  Family Medicine

## 2022-10-18 ENCOUNTER — APPOINTMENT (OUTPATIENT)
Dept: LAB | Facility: CLINIC | Age: 27
End: 2022-10-18
Payer: COMMERCIAL

## 2022-10-26 ENCOUNTER — LAB (OUTPATIENT)
Dept: LAB | Facility: CLINIC | Age: 27
End: 2022-10-26
Payer: COMMERCIAL

## 2022-10-26 DIAGNOSIS — F41.9 ANXIETY: ICD-10-CM

## 2022-10-26 DIAGNOSIS — R00.0 TACHYCARDIA: ICD-10-CM

## 2022-10-26 PROCEDURE — 82384 ASSAY THREE CATECHOLAMINES: CPT | Mod: 90

## 2022-10-26 PROCEDURE — 83835 ASSAY OF METANEPHRINES: CPT | Mod: 90

## 2022-10-26 PROCEDURE — 99000 SPECIMEN HANDLING OFFICE-LAB: CPT

## 2022-10-29 LAB
CREAT 24H UR-MRATE: 429 MG/D
CREAT UR-MCNC: 39 MG/DL
METANEPH 24H UR-MCNC: 37 UG/L
METANEPH 24H UR-MRATE: 41 UG/D
METANEPH+NORMETANEPH UR-IMP: ABNORMAL
METANEPH/CREAT 24H UR: 95 UG/G CRT
NORMETANEPHRINE 24H UR-MCNC: 39 UG/L
NORMETANEPHRINE 24H UR-MRATE: 43 UG/D
NORMETANEPHRINE/CREAT 24H UR: 100 UG/G CRT

## 2022-11-01 LAB
CATECHOLS UR-IMP: ABNORMAL
CREAT 24H UR-MRATE: 429 MG/D
CREAT UR-MCNC: 39 MG/DL
DOPAMINE 24H UR-MRATE: ABNORMAL UG/D
DOPAMINE UR-MCNC: ABNORMAL UG/L
DOPAMINE/CREAT UR: ABNORMAL UG/G CRT
EPINEPH 24H UR-MRATE: ABNORMAL
EPINEPH UR-MCNC: ABNORMAL UG/L
EPINEPH/CREAT UR: ABNORMAL UG/G CRT
NOREPINEPH 24H UR-MRATE: ABNORMAL UG/D
NOREPINEPH UR-MCNC: ABNORMAL UG/L
NOREPINEPH/CREAT UR: ABNORMAL UG/G CRT

## 2022-11-19 ENCOUNTER — HEALTH MAINTENANCE LETTER (OUTPATIENT)
Age: 27
End: 2022-11-19

## 2022-12-07 DIAGNOSIS — F41.9 ANXIETY: ICD-10-CM

## 2022-12-07 RX ORDER — CITALOPRAM HYDROBROMIDE 10 MG/1
10 TABLET ORAL DAILY
Qty: 90 TABLET | Refills: 0 | Status: SHIPPED | OUTPATIENT
Start: 2022-12-07 | End: 2023-03-29

## 2022-12-07 NOTE — TELEPHONE ENCOUNTER
Pending Prescriptions:                       Disp   Refills    citalopram (CELEXA) 10 MG tablet          90 tab*0            Sig: Take 1 tablet (10 mg) by mouth daily    Patient is out of medication, leaving for vacation 12/10/22, would like refill by 12/9/22. Elizabeth Salgado on 12/7/2022 at 10:56 AM

## 2022-12-07 NOTE — TELEPHONE ENCOUNTER
Left message for patient that requested medication was sent to pharmacy to be refilled.    Elizabeth BUSBY

## 2023-03-29 ENCOUNTER — OFFICE VISIT (OUTPATIENT)
Dept: FAMILY MEDICINE | Facility: CLINIC | Age: 28
End: 2023-03-29
Payer: COMMERCIAL

## 2023-03-29 VITALS
BODY MASS INDEX: 26.36 KG/M2 | SYSTOLIC BLOOD PRESSURE: 132 MMHG | OXYGEN SATURATION: 97 % | WEIGHT: 212 LBS | HEIGHT: 75 IN | HEART RATE: 76 BPM | DIASTOLIC BLOOD PRESSURE: 72 MMHG | RESPIRATION RATE: 16 BRPM | TEMPERATURE: 98.1 F

## 2023-03-29 DIAGNOSIS — I10 ESSENTIAL HYPERTENSION: ICD-10-CM

## 2023-03-29 DIAGNOSIS — F32.0 MILD MAJOR DEPRESSION (H): ICD-10-CM

## 2023-03-29 DIAGNOSIS — R68.82 LOW LIBIDO: ICD-10-CM

## 2023-03-29 DIAGNOSIS — F41.9 ANXIETY: ICD-10-CM

## 2023-03-29 DIAGNOSIS — Z00.00 ROUTINE GENERAL MEDICAL EXAMINATION AT A HEALTH CARE FACILITY: Primary | ICD-10-CM

## 2023-03-29 DIAGNOSIS — Z13.6 CARDIOVASCULAR SCREENING; LDL GOAL LESS THAN 100: ICD-10-CM

## 2023-03-29 PROCEDURE — 99214 OFFICE O/P EST MOD 30 MIN: CPT | Mod: 25 | Performed by: FAMILY MEDICINE

## 2023-03-29 PROCEDURE — 99395 PREV VISIT EST AGE 18-39: CPT | Performed by: FAMILY MEDICINE

## 2023-03-29 RX ORDER — PROPRANOLOL HCL 60 MG
60 CAPSULE, EXTENDED RELEASE 24HR ORAL DAILY
Qty: 90 CAPSULE | Refills: 3 | Status: SHIPPED | OUTPATIENT
Start: 2023-03-29 | End: 2023-05-10 | Stop reason: DRUGHIGH

## 2023-03-29 RX ORDER — LOSARTAN POTASSIUM 50 MG/1
50 TABLET ORAL DAILY
Qty: 90 TABLET | Refills: 3 | Status: CANCELLED | OUTPATIENT
Start: 2023-03-29

## 2023-03-29 RX ORDER — CITALOPRAM HYDROBROMIDE 10 MG/1
10 TABLET ORAL DAILY
Qty: 30 TABLET | Refills: 3 | Status: SHIPPED | OUTPATIENT
Start: 2023-03-29 | End: 2023-04-12

## 2023-03-29 ASSESSMENT — ANXIETY QUESTIONNAIRES
7. FEELING AFRAID AS IF SOMETHING AWFUL MIGHT HAPPEN: SEVERAL DAYS
GAD7 TOTAL SCORE: 9
6. BECOMING EASILY ANNOYED OR IRRITABLE: SEVERAL DAYS
4. TROUBLE RELAXING: SEVERAL DAYS
2. NOT BEING ABLE TO STOP OR CONTROL WORRYING: SEVERAL DAYS
7. FEELING AFRAID AS IF SOMETHING AWFUL MIGHT HAPPEN: SEVERAL DAYS
GAD7 TOTAL SCORE: 9
GAD7 TOTAL SCORE: 9
IF YOU CHECKED OFF ANY PROBLEMS ON THIS QUESTIONNAIRE, HOW DIFFICULT HAVE THESE PROBLEMS MADE IT FOR YOU TO DO YOUR WORK, TAKE CARE OF THINGS AT HOME, OR GET ALONG WITH OTHER PEOPLE: SOMEWHAT DIFFICULT
1. FEELING NERVOUS, ANXIOUS, OR ON EDGE: MORE THAN HALF THE DAYS
5. BEING SO RESTLESS THAT IT IS HARD TO SIT STILL: SEVERAL DAYS
3. WORRYING TOO MUCH ABOUT DIFFERENT THINGS: MORE THAN HALF THE DAYS
8. IF YOU CHECKED OFF ANY PROBLEMS, HOW DIFFICULT HAVE THESE MADE IT FOR YOU TO DO YOUR WORK, TAKE CARE OF THINGS AT HOME, OR GET ALONG WITH OTHER PEOPLE?: SOMEWHAT DIFFICULT

## 2023-03-29 ASSESSMENT — ENCOUNTER SYMPTOMS
SORE THROAT: 0
CONSTIPATION: 0
DYSURIA: 0
HEMATOCHEZIA: 0
FEVER: 0
PALPITATIONS: 1
CHILLS: 0
JOINT SWELLING: 0
HEARTBURN: 0
DIARRHEA: 0
COUGH: 0
NAUSEA: 0
HEADACHES: 0
ARTHRALGIAS: 0
NERVOUS/ANXIOUS: 1
DIZZINESS: 0
FREQUENCY: 0
MYALGIAS: 0
PARESTHESIAS: 0
SHORTNESS OF BREATH: 0
EYE PAIN: 0
ABDOMINAL PAIN: 0
HEMATURIA: 0
WEAKNESS: 0

## 2023-03-29 ASSESSMENT — PATIENT HEALTH QUESTIONNAIRE - PHQ9
SUM OF ALL RESPONSES TO PHQ QUESTIONS 1-9: 7
SUM OF ALL RESPONSES TO PHQ QUESTIONS 1-9: 7
10. IF YOU CHECKED OFF ANY PROBLEMS, HOW DIFFICULT HAVE THESE PROBLEMS MADE IT FOR YOU TO DO YOUR WORK, TAKE CARE OF THINGS AT HOME, OR GET ALONG WITH OTHER PEOPLE: SOMEWHAT DIFFICULT

## 2023-03-29 ASSESSMENT — PAIN SCALES - GENERAL: PAINLEVEL: NO PAIN (0)

## 2023-03-29 NOTE — PATIENT INSTRUCTIONS
Please make a morning lab before 9 am.    Please make a virtual visit in 2 weeks to recheck your blood pressure.    Please stop the losartan medication.    Please start the propranolol la 60 mg a day.    Please be aware that there will be an additional charge during your preventative visit due to either a new diagnosis and/or chronic disease management.    Preventative visits screen for diseases prior to they occur.  They do not cover for any new diagnosis or chronic disease management which would include medication refills, labs etc.    If you have questions regarding your coverage please check with your insurance provider.  At [facility] we need to code correctly to be in compliance with all insurance companies.        Thank you for choosing [facility].  You may be receiving an email and/or telephone survey request from [facility] Customer Experience regarding your visit today.  Please take a few minutes to respond to the survey to let us know how we are doing.      If you have questions or concerns, please contact us via We[employer]do. or you can contact your care team at [phone] option 2.    Our Clinic hours are:  Monday - Thursday 7am-6pm  Friday 7am-5pm    The [place] outpatient lab hours are:  Monday - Friday 7am-4:30pm    Appointments are required, call [phone]    If you have clinical questions after hours or would like to schedule an appointment,  call the clinic at [phone].            Preventive Health Recommendations  Male Ages 26 - 39    Yearly exam:             See your health care provider every year in order to  o   Review health changes.   o   Discuss preventive care.    o   Review your medicines if your doctor has prescribed any.  You should be tested each year for STDs (sexually transmitted diseases), if you re at risk.   After age 35, talk to your provider about cholesterol testing. If you are at risk for heart disease, have your cholesterol tested at least every 5 years.   If you are  at risk for diabetes, you should have a diabetes test (fasting glucose).  Shots: Get a flu shot each year. Get a tetanus shot every 10 years.     Nutrition:  Eat at least 5 servings of fruits and vegetables daily.   Eat whole-grain bread, whole-wheat pasta and brown rice instead of white grains and rice.   Get adequate Calcium and Vitamin D.     Lifestyle  Exercise for at least 150 minutes a week (30 minutes a day, 5 days a week). This will help you control your weight and prevent disease.   Limit alcohol to one drink per day.   No smoking.   Wear sunscreen to prevent skin cancer.   See your dentist every six months for an exam and cleaning.

## 2023-03-29 NOTE — PROGRESS NOTES
SUBJECTIVE:   CC: Ovidio is an 27 year old who presents for preventative health visit.   Additional Questions 3/29/2023   Roomed by [facility]   Patient has been advised of split billing requirements and indicates understanding: Yes     Healthy Habits:     Getting at least 3 servings of Calcium per day:  Yes    Bi-annual eye exam:  Yes    Dental care twice a year:  Yes    Sleep apnea or symptoms of sleep apnea:  Sleep apnea    Diet:  Regular (no restrictions)    Frequency of exercise:  4-5 days/week    Duration of exercise:  30-45 minutes    Taking medications regularly:  Yes    Barriers to taking medications:  None    Medication side effects:  Other    PHQ-2 Total Score: 2    Additional concerns today:  Yes  Discuss different medication today, would like to discuss propanolol.     He has read up on the medication and would like to try this to see if he could also get off the citalopram.  He feels like a disconnect on citalopram medication for a few hours after taking it in the morning.  He cannot take it at night since he tried in the past.    He has decrease libido.  Will check testosterone again. He states that getting an erection and maintaining it is not as much of an issue.  Offered medications like viagra however he has declined at this time.    He is getting  this weekend.  He and his fiance have been trying to become pregnant for about 4 months.  Discussed preconception counseling regarding what he should be doing and not doing. She is taking prenatal vitamins too.      Hypertension Follow-up      Do you check your blood pressure regularly outside of the clinic? Yes     Are you following a low salt diet? No    Are your blood pressures ever more than 140 on the top number (systolic) OR more   than 90 on the bottom number (diastolic), for example 140/90? No    Depression and Anxiety Follow-Up    How are you doing with your depression since your last visit? No change    How are you doing with your anxiety  since your last visit?  No change    Are you having other symptoms that might be associated with depression or anxiety? Yes:  heart palpation, hard time falling asleep     Have you had a significant life event? OTHER: bought a house, getting  soon, getting busy work     Do you have any concerns with your use of alcohol or other drugs? No    Social History     Tobacco Use     Smoking status: Former     Types: Dip, chew, snus or snuff     Smokeless tobacco: Former     Types: Chew     Quit date: 3/8/2022     Tobacco comments:     About 4 tins a week    Vaping Use     Vaping Use: Never used   Substance Use Topics     Alcohol use: Yes     Comment: 4 drinks per week     Drug use: No     PHQ 2/23/2022 3/16/2022 3/29/2023   PHQ-9 Total Score 6 1 7   Q9: Thoughts of better off dead/self-harm past 2 weeks Not at all Not at all Not at all     CHRISTOPHER-7 SCORE 2/23/2022 3/16/2022 3/29/2023   Total Score - - -   Total Score 10 (moderate anxiety) - 9 (mild anxiety)   Total Score 10 0 9     Last PHQ-9 3/29/2023   1.  Little interest or pleasure in doing things 1   2.  Feeling down, depressed, or hopeless 1   3.  Trouble falling or staying asleep, or sleeping too much 2   4.  Feeling tired or having little energy 1   5.  Poor appetite or overeating 1   6.  Feeling bad about yourself 0   7.  Trouble concentrating 1   8.  Moving slowly or restless 0   Q9: Thoughts of better off dead/self-harm past 2 weeks 0   PHQ-9 Total Score 7   Difficulty at work, home, or with people -     CHRISTOPHER-7  3/29/2023   1. Feeling nervous, anxious, or on edge 2   2. Not being able to stop or control worrying 1   3. Worrying too much about different things 2   4. Trouble relaxing 1   5. Being so restless that it is hard to sit still 1   6. Becoming easily annoyed or irritable 1   7. Feeling afraid, as if something awful might happen 1   CHRISTOPHER-7 Total Score 9   If you checked any problems, how difficult have they made it for you to do your work, take care of  things at home, or get along with other people? Somewhat difficult       Suicide Assessment Five-step Evaluation and Treatment (SAFE-T)      Today's PHQ-2 Score:   PHQ-2 ( 1999 [employer]) 3/29/2023   Q1: Little interest or pleasure in doing things 1   Q2: Feeling down, depressed or hopeless 1   PHQ-2 Score 2   Q1: Little interest or pleasure in doing things Several days   Q2: Feeling down, depressed or hopeless Several days   PHQ-2 Score 2       Social History     Tobacco Use     Smoking status: Former     Types: Dip, chew, snus or snuff     Smokeless tobacco: Former     Types: Chew     Quit date: 3/8/2022     Tobacco comments:     About 4 tins a week    Substance Use Topics     Alcohol use: Yes     Comment: 4 drinks per week       Alcohol Use 3/29/2023   Prescreen: >3 drinks/day or >7 drinks/week? No   No flowsheet data found.    Last PSA: No results found for: PSA    Reviewed orders with patient. Reviewed health maintenance and updated orders accordingly - Yes      Reviewed and updated as needed this visit by clinical staff   Tobacco  Allergies  Meds              Reviewed and updated as needed this visit by Provider                     Review of Systems   Constitutional: Negative for chills and fever.   HENT: Negative for congestion, ear pain, hearing loss and sore throat.    Eyes: Negative for pain and visual disturbance.   Respiratory: Negative for cough and shortness of breath.    Cardiovascular: Positive for chest pain and palpitations. Negative for peripheral edema.   Gastrointestinal: Negative for abdominal pain, constipation, diarrhea, heartburn, hematochezia and nausea.   Genitourinary: Negative for dysuria, frequency, genital sores, hematuria and urgency.   Musculoskeletal: Negative for arthralgias, joint swelling and myalgias.   Skin: Negative for rash.   Neurological: Negative for dizziness, weakness, headaches and paresthesias.   Psychiatric/Behavioral: Positive for mood changes. The patient is  "nervous/anxious.          OBJECTIVE:   /72   Pulse 76   Temp 98.1  F (36.7  C) (Tympanic)   Resp 16   Ht 1.905 m (6' 3\")   Wt 96.2 kg (212 lb)   SpO2 97%   BMI 26.50 kg/m      Physical Exam  GENERAL: healthy, alert and no distress  EYES: Eyes grossly normal to inspection, PERRL and conjunctivae and sclerae normal  HENT: ear canals and TM's normal, nose and mouth without ulcers or lesions  NECK: no adenopathy, no asymmetry, masses, or scars and thyroid normal to palpation  RESP: lungs clear to auscultation - no rales, rhonchi or wheezes  CV: regular rate and rhythm, normal S1 S2, no S3 or S4, no murmur, click or rub, no peripheral edema and peripheral pulses strong  ABDOMEN: soft, nontender, no hepatosplenomegaly, no masses and bowel sounds normal   (male): normal male genitalia without lesions or urethral discharge, no hernia  MS: no gross musculoskeletal defects noted, no edema  SKIN: no suspicious lesions or rashes  NEURO: Normal strength and tone, mentation intact and speech normal  PSYCH: mentation appears normal, affect normal/bright  LYMPH: no cervical adenopathy        ASSESSMENT/PLAN:   (Z00.00) Routine general medical examination at a health care facility  (primary encounter diagnosis)  Comment: Discussed healthy lifestyle and preventative cares.    Plan:     (F32.0) Mild major depression (H)  Comment: not controlled completely. Will change his medications around  Plan:     (I10) Essential hypertension  Comment: controlled changed med  Plan: Basic metabolic panel, propranolol ER (INDERAL         LA) 60 MG 24 hr capsule            (F41.9) Anxiety  Comment: changed med with the goal of weaning off his citalopram medication  Plan: citalopram (CELEXA) 10 MG tablet, propranolol         ER (INDERAL LA) 60 MG 24 hr capsule            (Z13.6) CARDIOVASCULAR SCREENING; LDL GOAL LESS THAN 100  Comment:   Plan: Lipid panel reflex to direct LDL Fasting,         CANCELED: Lipid panel reflex to direct LDL "         Non-fasting            (R62.82) Low libido  Comment: recheck testosterone level again  Plan: Testosterone, total              Patient has been advised of split billing requirements and indicates understanding: Yes      COUNSELING:   Reviewed preventive health counseling, as reflected in patient instructions       Regular exercise       Healthy diet/nutrition        He reports that he has quit smoking. His smoking use included dip, chew, snus or snuff. He quit smokeless tobacco use about 12 months ago.  His smokeless tobacco use included chew.        Alfredito Stephenson MD  [facility]  Answers for HPI/ROS submitted by the patient on 3/29/2023  If you checked off any problems, how difficult have these problems made it for you to do your work, take care of things at home, or get along with other people?: Somewhat difficult  PHQ9 TOTAL SCORE: 7  CHRISTOPHER 7 TOTAL SCORE: 9

## 2023-04-10 ENCOUNTER — LAB (OUTPATIENT)
Dept: LAB | Facility: CLINIC | Age: 28
End: 2023-04-10
Payer: COMMERCIAL

## 2023-04-10 DIAGNOSIS — Z13.6 CARDIOVASCULAR SCREENING; LDL GOAL LESS THAN 100: ICD-10-CM

## 2023-04-10 DIAGNOSIS — I10 ESSENTIAL HYPERTENSION: ICD-10-CM

## 2023-04-10 DIAGNOSIS — R68.82 LOW LIBIDO: ICD-10-CM

## 2023-04-10 LAB
ANION GAP SERPL CALCULATED.3IONS-SCNC: 9 MMOL/L (ref 7–15)
BUN SERPL-MCNC: 15.2 MG/DL (ref 6–20)
CALCIUM SERPL-MCNC: 9.6 MG/DL (ref 8.6–10)
CHLORIDE SERPL-SCNC: 105 MMOL/L (ref 98–107)
CHOLEST SERPL-MCNC: 159 MG/DL
CREAT SERPL-MCNC: 1.02 MG/DL (ref 0.67–1.17)
DEPRECATED HCO3 PLAS-SCNC: 27 MMOL/L (ref 22–29)
GFR SERPL CREATININE-BSD FRML MDRD: >90 ML/MIN/1.73M2
GLUCOSE SERPL-MCNC: 108 MG/DL (ref 70–99)
HDLC SERPL-MCNC: 43 MG/DL
LDLC SERPL CALC-MCNC: 91 MG/DL
NONHDLC SERPL-MCNC: 116 MG/DL
POTASSIUM SERPL-SCNC: 4 MMOL/L (ref 3.4–5.3)
SODIUM SERPL-SCNC: 141 MMOL/L (ref 136–145)
TRIGL SERPL-MCNC: 124 MG/DL

## 2023-04-10 PROCEDURE — 84403 ASSAY OF TOTAL TESTOSTERONE: CPT

## 2023-04-10 PROCEDURE — 80048 BASIC METABOLIC PNL TOTAL CA: CPT

## 2023-04-10 PROCEDURE — 36415 COLL VENOUS BLD VENIPUNCTURE: CPT

## 2023-04-10 PROCEDURE — 80061 LIPID PANEL: CPT

## 2023-04-12 ENCOUNTER — VIRTUAL VISIT (OUTPATIENT)
Dept: FAMILY MEDICINE | Facility: CLINIC | Age: 28
End: 2023-04-12
Payer: COMMERCIAL

## 2023-04-12 DIAGNOSIS — F41.9 ANXIETY: Primary | ICD-10-CM

## 2023-04-12 DIAGNOSIS — R68.82 LOW LIBIDO: ICD-10-CM

## 2023-04-12 LAB — TESTOST SERPL-MCNC: 282 NG/DL (ref 240–950)

## 2023-04-12 PROCEDURE — 99213 OFFICE O/P EST LOW 20 MIN: CPT | Mod: 93 | Performed by: FAMILY MEDICINE

## 2023-04-12 NOTE — PATIENT INSTRUCTIONS
Please stop citalopram medication and follow up in 4 weeks for anxiety.          Thank you for choosing [facility].  You may be receiving an email and/or telephone survey request from [facility] Customer Experience regarding your visit today.  Please take a few minutes to respond to the survey to let us know how we are doing.      If you have questions or concerns, please contact us via Mo[employer] or you can contact your care team at [phone] option 2.    Our Clinic hours are:  Monday - Thursday 7am-6pm  Friday 7am-5pm    The [place] outpatient lab hours are:  Monday - Friday 7am-4:30pm    Appointments are required, call [phone]    If you have clinical questions after hours or would like to schedule an appointment,  call the clinic at [phone].

## 2023-04-12 NOTE — PROGRESS NOTES
"Ovidio is a 27 year old who is being evaluated via a billable telephone visit.      What phone number would you like to be contacted at? [phone]   How would you like to obtain your AVS? Zohra  Distant Location (provider location):  On-site    Assessment & Plan     Anxiety  Feels like anxiety is controlled, wants to go off the citalopram medication.  Will stop and recheck in 4 weeks.  Blood pressure is controlled.  Pulse was in the 70s on new medication.    Low libido  He has low normal testosterone and has low libido, wants to have further evaluation done.     - Adult Endocrinology  Referral; Future             BMI:   Estimated body mass index is 26.5 kg/m  as calculated from the following:    Height as of 3/29/23: 1.905 m (6' 3\").    Weight as of 3/29/23: 96.2 kg (212 lb).           Alfredito Stephenson MD  [facility]    Chikis Nolan is a 27 year old, presenting for the following health issues:  Hypertension      HPI     Hypertension Follow-up      Do you check your blood pressure regularly outside of the clinic? Yes at home.     Are you following a low salt diet? No    Are your blood pressures ever more than 140 on the top number (systolic) OR more   than 90 on the bottom number (diastolic), for example 140/90? No                Review of Systems         Objective    Vitals - Patient Reported  Systolic (Patient Reported): 133  Diastolic (Patient Reported): 79      Vitals:  No vitals were obtained today due to virtual visit.    Physical Exam   healthy, alert and no distress  PSYCH: Alert and oriented times 3; coherent speech, normal   rate and volume, able to articulate logical thoughts, able   to abstract reason, no tangential thoughts, no hallucinations   or delusions  His affect is normal  RESP: No cough, no audible wheezing, able to talk in full sentences  Remainder of exam unable to be completed due to telephone visits    Discussed recent lab results.            Phone " call duration: 8 minutes

## 2023-05-10 ENCOUNTER — VIRTUAL VISIT (OUTPATIENT)
Dept: FAMILY MEDICINE | Facility: CLINIC | Age: 28
End: 2023-05-10
Payer: COMMERCIAL

## 2023-05-10 DIAGNOSIS — F41.9 ANXIETY: ICD-10-CM

## 2023-05-10 DIAGNOSIS — I10 ESSENTIAL HYPERTENSION: Primary | ICD-10-CM

## 2023-05-10 PROCEDURE — 99214 OFFICE O/P EST MOD 30 MIN: CPT | Mod: 93 | Performed by: FAMILY MEDICINE

## 2023-05-10 RX ORDER — PROPRANOLOL HYDROCHLORIDE 80 MG/1
80 CAPSULE, EXTENDED RELEASE ORAL DAILY
Qty: 90 CAPSULE | Refills: 3 | Status: SHIPPED | OUTPATIENT
Start: 2023-05-10 | End: 2024-01-08

## 2023-05-10 ASSESSMENT — ANXIETY QUESTIONNAIRES
7. FEELING AFRAID AS IF SOMETHING AWFUL MIGHT HAPPEN: NOT AT ALL
5. BEING SO RESTLESS THAT IT IS HARD TO SIT STILL: NOT AT ALL
2. NOT BEING ABLE TO STOP OR CONTROL WORRYING: SEVERAL DAYS
3. WORRYING TOO MUCH ABOUT DIFFERENT THINGS: SEVERAL DAYS
1. FEELING NERVOUS, ANXIOUS, OR ON EDGE: SEVERAL DAYS
GAD7 TOTAL SCORE: 6
6. BECOMING EASILY ANNOYED OR IRRITABLE: SEVERAL DAYS
GAD7 TOTAL SCORE: 6
IF YOU CHECKED OFF ANY PROBLEMS ON THIS QUESTIONNAIRE, HOW DIFFICULT HAVE THESE PROBLEMS MADE IT FOR YOU TO DO YOUR WORK, TAKE CARE OF THINGS AT HOME, OR GET ALONG WITH OTHER PEOPLE: SOMEWHAT DIFFICULT

## 2023-05-10 ASSESSMENT — PATIENT HEALTH QUESTIONNAIRE - PHQ9
5. POOR APPETITE OR OVEREATING: MORE THAN HALF THE DAYS
SUM OF ALL RESPONSES TO PHQ QUESTIONS 1-9: 5

## 2023-05-10 NOTE — PROGRESS NOTES
"Ovidio is a 27 year old who is being evaluated via a billable telephone visit.      What phone number would you like to be contacted at? [phone]  How would you like to obtain your AVS? Zohra  Distant Location (provider location):  On-site    Assessment & Plan     Essential hypertension  Borderline controlled still getting bp readings about 140/90.  Pulse in the 70s, will increase to propranolol la 80 mg a day and he will monitor  - propranolol ER (INDERAL LA) 80 MG 24 hr capsule; Take 1 capsule (80 mg) by mouth daily    Anxiety  He feels anxiety is controlled, feeling fine off his citalopram medication.                 BMI:   Estimated body mass index is 26.5 kg/m  as calculated from the following:    Height as of 3/29/23: 1.905 m (6' 3\").    Weight as of 3/29/23: 96.2 kg (212 lb).           Alfredito Stephenson MD  [facility]    Chikis Nolan is a 27 year old, presenting for the following health issues:  Recheck Medication        5/10/2023     4:44 PM   Additional Questions   Roomed by Taylor [facility]   Accompanied by self         5/10/2023     4:44 PM   Patient Reported Additional Medications   Patient reports taking the following new medications no new meds     HPI     Hypertension Follow-up      Do you check your blood pressure regularly outside of the clinic? Yes     Are you following a low salt diet? No    Are your blood pressures ever more than 140 on the top number (systolic) OR more   than 90 on the bottom number (diastolic), for example 140/90? Yes, hovering around 140's-90's     Anxiety Follow-Up    How are you doing with your anxiety since your last visit? Improved      Are you having other symptoms that might be associated with anxiety? No    Have you had a significant life event? No     Are you feeling depressed? No    Do you have any concerns with your use of alcohol or other drugs? No    Social History     Tobacco Use     Smoking status: Former     Types: Dip, chew, snus or " snuff     Smokeless tobacco: Former     Types: Chew     Quit date: 3/8/2022     Tobacco comments:     About 4 tins a week    Vaping Use     Vaping status: Never Used   Substance Use Topics     Alcohol use: Yes     Comment: 4 drinks per week     Drug use: No         3/16/2022     2:06 PM 3/29/2023     2:13 PM 5/10/2023     4:47 PM   CHRISTOPHER-7 SCORE   Total Score  9 (mild anxiety)    Total Score 0 9 6         3/16/2022     2:06 PM 3/29/2023     2:13 PM 5/10/2023     4:47 PM   PHQ   PHQ-9 Total Score 1 7 5   Q9: Thoughts of better off dead/self-harm past 2 weeks Not at all Not at all Not at all         5/10/2023     4:47 PM   Last PHQ-9   1.  Little interest or pleasure in doing things 1   2.  Feeling down, depressed, or hopeless 0   3.  Trouble falling or staying asleep, or sleeping too much 3   4.  Feeling tired or having little energy 1   5.  Poor appetite or overeating 0   6.  Feeling bad about yourself 0   7.  Trouble concentrating 0   8.  Moving slowly or restless 0   Q9: Thoughts of better off dead/self-harm past 2 weeks 0   PHQ-9 Total Score 5   Difficulty at work, home, or with people Somewhat difficult         5/10/2023     4:47 PM   CHRISTOPHER-7    1. Feeling nervous, anxious, or on edge 1   2. Not being able to stop or control worrying 1   3. Worrying too much about different things 1   4. Trouble relaxing 2   5. Being so restless that it is hard to sit still 0   6. Becoming easily annoyed or irritable 1   7. Feeling afraid, as if something awful might happen 0   CHRISTOPHER-7 Total Score 6   If you checked any problems, how difficult have they made it for you to do your work, take care of things at home, or get along with other people? Somewhat difficult       Checking on bp due to starting med and seeing how it is helping with anxiety.  Stopped ssri due to potential side effects      Review of Systems         Objective           Vitals:  No vitals were obtained today due to virtual visit.    Physical Exam   healthy, alert  and no distress  PSYCH: Alert and oriented times 3; coherent speech, normal   rate and volume, able to articulate logical thoughts, able   to abstract reason, no tangential thoughts, no hallucinations   or delusions  His affect is normal  RESP: No cough, no audible wheezing, able to talk in full sentences  Remainder of exam unable to be completed due to telephone visits                Phone call duration: 5 minutes

## 2023-06-26 NOTE — CONFIDENTIAL NOTE
RECORDS RECEIVED FROM: internal /ce   DATE RECEIVED: 9.7.23   NOTES (FOR ALL VISITS) STATUS DETAILS   OFFICE NOTES from referring provider internal  Alfredito Stephenson MD     MEDICATION LIST internal     IMAGING        CT (HEAD[facility]NECK/CHEST[facility]     LABS     DIABETES: HBGA1C, CREATININE, FASTING LIPIDS, MICROALBUMIN URINE, POTASSIUM, TSH, T4    THYROID: TSH, T4, CBC, THYRODLONULIN, TOTAL T3, FREE T4, CALCITONIN, CEA internal  BMP- 4.10.23   Testosterone-4.10.23   Lipid- 4.10.23   TSH/T4- 3.8.22  Received labs- 11.10.21

## 2023-09-04 ASSESSMENT — ENCOUNTER SYMPTOMS
POLYPHAGIA: 0
SLEEP DISTURBANCES DUE TO BREATHING: 0
LEG PAIN: 0
ALTERED TEMPERATURE REGULATION: 0
NIGHT SWEATS: 0
SYNCOPE: 0
PALPITATIONS: 1
INCREASED ENERGY: 1
CHILLS: 0
ORTHOPNEA: 0
LIGHT-HEADEDNESS: 0
HALLUCINATIONS: 0
FEVER: 0
HYPOTENSION: 0
WEIGHT GAIN: 1
EXERCISE INTOLERANCE: 0
FATIGUE: 1
POLYDIPSIA: 0
HYPERTENSION: 0
WEIGHT LOSS: 0
DECREASED APPETITE: 0

## 2023-09-07 ENCOUNTER — PRE VISIT (OUTPATIENT)
Dept: ENDOCRINOLOGY | Facility: CLINIC | Age: 28
End: 2023-09-07

## 2023-09-07 ENCOUNTER — VIRTUAL VISIT (OUTPATIENT)
Dept: ENDOCRINOLOGY | Facility: CLINIC | Age: 28
End: 2023-09-07
Attending: FAMILY MEDICINE
Payer: COMMERCIAL

## 2023-09-07 DIAGNOSIS — R68.82 LOW LIBIDO: ICD-10-CM

## 2023-09-07 PROCEDURE — 99204 OFFICE O/P NEW MOD 45 MIN: CPT | Mod: VID | Performed by: STUDENT IN AN ORGANIZED HEALTH CARE EDUCATION/TRAINING PROGRAM

## 2023-09-07 NOTE — NURSING NOTE
Is the patient currently in the state of [place]? YES    Visit mode:VIDEO    If the visit is dropped, the patient can be reconnected by: VIDEO VISIT: Text to cell phone:   Telephone Information:   Mobile [phone]       Will anyone else be joining the visit? NO  (If patient encounters technical issues they should call [phone]    How would you like to obtain your AVS? MyChart    Are changes needed to the allergy or medication list? No    Reason for visit: Consult (New Patient )    Sehllie MOORE

## 2023-09-07 NOTE — PROGRESS NOTES
Endocrinology Clinic Visit 9/7/2023      Video-Visit Details    Type of service:  Video Visit    Joined the call at 9/7/2023, 8:07:39 am.  Left the call at 9/7/2023, 8:34:12 am.    Originating Location (pt. Location): Other parked car        Distant Location (provider location):  Off-site    Mode of Communication:  Video Conference via [employer]    Physician has received verbal consent for a Video Visit from the patient? Yes    I spent a total of 45 minutes on the date of encounter reviewing medical records, evaluating the patient, coordinating care and documenting in the EHR, as detailed above.      NAME:  Ovidio Burns  PCP:  Alfredito Stephenson  MRN:  [id number]  Reason for Consult:  concern for hypogonadism  Requesting Provider:  Alfredito Stephenson    Chief Complaint     Chief Complaint   Patient presents with    Consult     New Patient        History of Present Illness     Ovidio Burns is a 27 year old male who is seen in video visit for concern for hypogonadism.    2 years he noticed feeling low libido associated with fatigue and feeling burned out. He noticed that his penis retract almost everyday. He said he has zero sexual desire. He is . His wife is pregnant. He denied any issues with fertility. He denied issues with erection when the desire is present. No more morning erection. No issues with ejaculation. He reported 20 lbs weight gain in 1 year, he stopped working due to low energy. He also reported change in his diet since getting  4/2023.      History of:  - Normal pubertal development: no  - Osteoporosis: no  - Cryptorchidism: no  - Testicular trauma of infection: no  - Narcotic use: no  - Steroid Use: no  - Ketoconazole use: no  - Alcohol abuse: no  - Other illicit drug use: no  - Anosmia: no  - Peripheral vision abnormality: no  - Gynecomastia: no    Other related co-morbidities: No prostate problems or LUTS. No sleep apnea. No heart problems or chest pain. He does not  smoke.    He is on propranolol for HTN and PVC.    He had TT checked at 8am on 3/2022 was 298 and on 4/10/23 was 282. He is not convinced with that being normal range.    Family hx: HTN and heart disease in his father.       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    Problem List     Patient Active Problem List   Diagnosis    Uvular hypertrophy    Mild major depression (H)    Anxiety    Essential hypertension    CARDIOVASCULAR SCREENING; LDL GOAL LESS THAN 100        Medications     Current Outpatient Medications   Medication    propranolol ER (INDERAL LA) 80 MG 24 hr capsule     No current facility-administered medications for this visit.        Allergies     Allergies   Allergen Reactions    Lisinopril      Numb fingers       Medical / Surgical History     Past Medical History:   Diagnosis Date    Exercise-induced asthma 4/28/2009    Other abnormal heart sounds At Birth    Heart Murmur/neg. Echo     Past Surgical History:   Procedure Laterality Date    CIRCUMCISION,CLAMP  1994    HC TOOTH EXTRACTION W/FORCEP  2002       Social History     Social History     Socioeconomic History    Marital status:      Spouse name: Not on file    Number of children: 0    Years of education: 12th    Highest education level: Not on file   Occupational History     Employer: [employer]   Tobacco Use    Smoking status: Former     Types: Dip, chew, snus or snuff    Smokeless tobacco: Former     Types: Chew     Quit date: 3/8/2022    Tobacco comments:     About 4 tins a week    Vaping Use    Vaping Use: Never used   Substance and Sexual Activity    Alcohol use: Yes     Comment: 4 drinks per week    Drug use: No    Sexual activity: Yes     Partners: Female     Birth control/protection: None   Other Topics Concern    Parent/sibling w/ CABG, MI or angioplasty before 65F 55M? No   Social History Narrative    Not on file     Social Determinants of Health     Financial Resource Strain: Not on file   Food Insecurity:  Not on file   Transportation Needs: Not on file   Physical Activity: Not on file   Stress: Not on file   Social Connections: Not on file   Intimate Partner Violence: Not on file   Housing Stability: Not on file       Family History     Family History   Problem Relation Age of Onset    Hypertension Maternal Grandmother     Breast Cancer Paternal Grandmother     Diabetes Paternal Grandfather     Hypertension Paternal Grandfather     Depression Mother     Hypertension Father     Cerebrovascular Disease Maternal Grandfather     Heart Disease Maternal Grandfather         MIs and CABG    Mental Illness Brother         anxiety       ROS     12 ROS completed, pertinent positive and negative in HPI  Answers submitted by the patient for this visit:  Symptoms you have experienced in the last 30 days (Submitted on 9/4/2023)  General Symptoms: Yes  Skin Symptoms: No  HENT Symptoms: No  EYE SYMPTOMS: No  HEART SYMPTOMS: Yes  LUNG SYMPTOMS: No  INTESTINAL SYMPTOMS: No  URINARY SYMPTOMS: No  REPRODUCTIVE SYMPTOMS: Yes  SKELETAL SYMPTOMS: No  BLOOD SYMPTOMS: No  NERVOUS SYSTEM SYMPTOMS: No  MENTAL HEALTH SYMPTOMS: No  Please answer the questions below to tell us what conditions you are experiencing: (Submitted on 9/4/2023)  Fever: No  Loss of appetite: No  Weight loss: No  Weight gain: Yes  Fatigue: Yes  Night sweats: No  Chills: No  Increased stress: No  Excessive hunger: No  Excessive thirst: No  Feeling hot or cold when others believe the temperature is normal: No  Loss of height: No  Post-operative complications: No  Surgical site pain: No  Hallucinations: No  Change in or Loss of Energy: Yes  Hyperactivity: No  Confusion: No  Please answer the questions below to tell us what condition you are experiencing: (Submitted on 9/4/2023)  Chest pain or pressure: Yes  Fast or irregular heartbeat: Yes  Pain in legs with walking: No  Trouble breathing while lying down: No  Fingers or toes appear blue: No  High blood pressure: No  Low  blood pressure: No  Fainting: No  Murmurs: No  Pacemaker: No  Varicose veins: No  Edema or swelling: No  Wake up at night with shortness of breath: No  Light-headedness: No  Exercise intolerance: No  Please answer the questions below to tell us what condition you are experiencing: (Submitted on 9/4/2023)  Scrotal pain or swelling: No  Erectile dysfunction: Yes  Penile discharge: No  Genital ulcers: No  Reduced libido: Yes    Physical Exam   There were no vitals taken for this visit.   GENERAL: Healthy, alert and no distress  EYES: Eyes grossly normal to inspection.  No discharge or erythema, or obvious scleral/conjunctival abnormalities.  RESP: No audible wheeze, cough, or visible cyanosis.  No visible retractions or increased work of breathing.    SKIN: Visible skin clear. No significant rash, abnormal pigmentation or lesions.  NEURO: Cranial nerves grossly intact.  Mentation and speech appropriate for age.  PSYCH: Mentation appears normal, affect normal/bright, judgement and insight intact, normal speech and appearance well-groomed.     Labs/Imaging     Pertinent Labs were reviewed and updated in EPIC and discussed briefly.  Radiology Results were  reviewed and updated in EPIC and discussed briefly.    Summary of recent findings:   No results found for: A1C    TSH   Date Value Ref Range Status   03/08/2022 1.74 0.40 - 4.00 mU/L Final   04/15/2020 1.77 0.40 - 4.00 mU/L Final       Creatinine   Date Value Ref Range Status   04/10/2023 1.02 0.67 - 1.17 mg/dL Final   04/15/2020 1.31 (H) 0.66 - 1.25 mg/dL Final       Recent Labs   Lab Test 04/10/23  0808 08/05/19  1214   CHOL 159 141   HDL 43 52   LDL 91 79   TRIG 124 51       No results found for: [id number], [id number], [id number]    I personally reviewed the patient's outside records from [employer] EMR. Summary of pertinent findings in [facility].    Impression / Plan       1) concern for Hypogonadism  To confirm low testosterone and evaluate etiology:     -- Check early AM  total testosterone x2, and free T by equilibrium dialysis and SHBG     -- Check LH and FSH          -- Depending on etiology of hypogonadism, will need further evaluation (iron studies, MRI brain, etc). He is not obese, does not have sleep apnea (no snoring). If hypogonadotropic hypogonadism, will complete evaluation for these things while also starting testosterone replacement. He does not have any major contraindications to testosterone therapy.         We discussed the risks and benefits of testosterone therapy at length today including expected improvements from testosterone use (libido, erectile function, bone density) and that the side effects and specifically cardiovascular effects of testosterone are not fully known. The relationship between T administration and the risk of prostate cancer remains poorly understood. We will monitor for prostate cancer by checking PSA.     Testosterone therapy can also increase lower urinary tract symptoms. . Worsen untreated DANIELA. Common drug-related adverse events include acne, oiliness of skin, and breast tenderness.    We discussed fertility issue once on exogenous testosterone.    I reviewed the need to monitor hematocrit as elevated hematocrit is a known side effect and can cause increased blood viscosity and thereby increase the risk of stroke or thromboembolism.       If levels come back again within normal, we discussed continue working up for other etiologies with his PCP. Consider sleep study and weight loss.        Test and/or medications prescribed today:  No orders of the defined types were placed in this encounter.        Follow up: pending above      Gregory Up MD  Endocrinology, Diabetes and Metabolism  [facility]

## 2023-09-07 NOTE — LETTER
9/7/2023       RE: Ovidio Burns  [address] [facility] [address]     Dear Colleague,    Thank you for referring your patient, Ovidio Burns, to the [facility] ENDOCRINOLOGY CLINIC [place] at [facility]. Please see a copy of my visit note below.    Endocrinology Clinic Visit 9/7/2023      Video-Visit Details    Type of service:  Video Visit    Joined the call at 9/7/2023, 8:07:39 am.  Left the call at 9/7/2023, 8:34:12 am.    Originating Location (pt. Location): Other parked car        Distant Location (provider location):  Off-site    Mode of Communication:  Video Conference via [facility]    Physician has received verbal consent for a Video Visit from the patient? Yes    I spent a total of 45 minutes on the date of encounter reviewing medical records, evaluating the patient, coordinating care and documenting in the EHR, as detailed above.      NAME:  Ovidio Burns  PCP:  Alfredito Stephenson  MRN:  [id number]  Reason for Consult:  concern for hypogonadism  Requesting Provider:  Alfredito Stephenson    Chief Complaint     Chief Complaint   Patient presents with    Consult     New Patient        History of Present Illness     Ovidio Burns is a 27 year old male who is seen in video visit for concern for hypogonadism.    2 years he noticed feeling low libido associated with fatigue and feeling burned out. He noticed that his penis retract almost everyday. He said he has zero sexual desire. He is . His wife is pregnant. He denied any issues with fertility. He denied issues with erection when the desire is present. No more morning erection. No issues with ejaculation. He reported 20 lbs weight gain in 1 year, he stopped working due to low energy. He also reported change in his diet since getting  4/2023.      History of:  - Normal pubertal development: no  - Osteoporosis: no  - Cryptorchidism: no  - Testicular trauma of  infection: no  - Narcotic use: no  - Steroid Use: no  - Ketoconazole use: no  - Alcohol abuse: no  - Other illicit drug use: no  - Anosmia: no  - Peripheral vision abnormality: no  - Gynecomastia: no    Other related co-morbidities: No prostate problems or LUTS. No sleep apnea. No heart problems or chest pain. He does not smoke.    He is on propranolol for HTN and PVC.    He had TT checked at 8am on 3/2022 was 298 and on 4/10/23 was 282. He is not convinced with that being normal range.    Family hx: HTN and heart disease in his father.       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    Problem List     Patient Active Problem List   Diagnosis    Uvular hypertrophy    Mild major depression (H)    Anxiety    Essential hypertension    CARDIOVASCULAR SCREENING; LDL GOAL LESS THAN 100        Medications     Current Outpatient Medications   Medication    propranolol ER (INDERAL LA) 80 MG 24 hr capsule     No current facility-administered medications for this visit.        Allergies     Allergies   Allergen Reactions    Lisinopril      Numb fingers       Medical / Surgical History     Past Medical History:   Diagnosis Date    Exercise-induced asthma 4/28/2009    Other abnormal heart sounds At Birth    Heart Murmur/neg. Echo     Past Surgical History:   Procedure Laterality Date    CIRCUMCISION,CLAMP  1994    HC TOOTH EXTRACTION W/FORCEP  2002       Social History     Social History     Socioeconomic History    Marital status:      Spouse name: Not on file    Number of children: 0    Years of education: 12th    Highest education level: Not on file   Occupational History     Employer: [employer]   Tobacco Use    Smoking status: Former     Types: Dip, chew, snus or snuff    Smokeless tobacco: Former     Types: Chew     Quit date: 3/8/2022    Tobacco comments:     About 4 tins a week    Vaping Use    Vaping Use: Never used   Substance and Sexual Activity    Alcohol use: Yes     Comment: 4 drinks per  week    Drug use: No    Sexual activity: Yes     Partners: Female     Birth control/protection: None   Other Topics Concern    Parent/sibling w/ CABG, MI or angioplasty before 65F 55M? No   Social History Narrative    Not on file     Social Determinants of Health     Financial Resource Strain: Not on file   Food Insecurity: Not on file   Transportation Needs: Not on file   Physical Activity: Not on file   Stress: Not on file   Social Connections: Not on file   Intimate Partner Violence: Not on file   Housing Stability: Not on file       Family History     Family History   Problem Relation Age of Onset    Hypertension Maternal Grandmother     Breast Cancer Paternal Grandmother     Diabetes Paternal Grandfather     Hypertension Paternal Grandfather     Depression Mother     Hypertension Father     Cerebrovascular Disease Maternal Grandfather     Heart Disease Maternal Grandfather         MIs and CABG    Mental Illness Brother         anxiety       ROS     12 ROS completed, pertinent positive and negative in HPI  Answers submitted by the patient for this visit:  Symptoms you have experienced in the last 30 days (Submitted on 9/4/2023)  General Symptoms: Yes  Skin Symptoms: No  HENT Symptoms: No  EYE SYMPTOMS: No  HEART SYMPTOMS: Yes  LUNG SYMPTOMS: No  INTESTINAL SYMPTOMS: No  URINARY SYMPTOMS: No  REPRODUCTIVE SYMPTOMS: Yes  SKELETAL SYMPTOMS: No  BLOOD SYMPTOMS: No  NERVOUS SYSTEM SYMPTOMS: No  MENTAL HEALTH SYMPTOMS: No  Please answer the questions below to tell us what conditions you are experiencing: (Submitted on 9/4/2023)  Fever: No  Loss of appetite: No  Weight loss: No  Weight gain: Yes  Fatigue: Yes  Night sweats: No  Chills: No  Increased stress: No  Excessive hunger: No  Excessive thirst: No  Feeling hot or cold when others believe the temperature is normal: No  Loss of height: No  Post-operative complications: No  Surgical site pain: No  Hallucinations: No  Change in or Loss of Energy: Yes  Hyperactivity:  No  Confusion: No  Please answer the questions below to tell us what condition you are experiencing: (Submitted on 9/4/2023)  Chest pain or pressure: Yes  Fast or irregular heartbeat: Yes  Pain in legs with walking: No  Trouble breathing while lying down: No  Fingers or toes appear blue: No  High blood pressure: No  Low blood pressure: No  Fainting: No  Murmurs: No  Pacemaker: No  Varicose veins: No  Edema or swelling: No  Wake up at night with shortness of breath: No  Light-headedness: No  Exercise intolerance: No  Please answer the questions below to tell us what condition you are experiencing: (Submitted on 9/4/2023)  Scrotal pain or swelling: No  Erectile dysfunction: Yes  Penile discharge: No  Genital ulcers: No  Reduced libido: Yes    Physical Exam   There were no vitals taken for this visit.   GENERAL: Healthy, alert and no distress  EYES: Eyes grossly normal to inspection.  No discharge or erythema, or obvious scleral/conjunctival abnormalities.  RESP: No audible wheeze, cough, or visible cyanosis.  No visible retractions or increased work of breathing.    SKIN: Visible skin clear. No significant rash, abnormal pigmentation or lesions.  NEURO: Cranial nerves grossly intact.  Mentation and speech appropriate for age.  PSYCH: Mentation appears normal, affect normal/bright, judgement and insight intact, normal speech and appearance well-groomed.     Labs/Imaging     Pertinent Labs were reviewed and updated in EPIC and discussed briefly.  Radiology Results were  reviewed and updated in EPIC and discussed briefly.    Summary of recent findings:   No results found for: A1C    TSH   Date Value Ref Range Status   03/08/2022 1.74 0.40 - 4.00 mU/L Final   04/15/2020 1.77 0.40 - 4.00 mU/L Final       Creatinine   Date Value Ref Range Status   04/10/2023 1.02 0.67 - 1.17 mg/dL Final   04/15/2020 1.31 (H) 0.66 - 1.25 mg/dL Final       Recent Labs   Lab Test 04/10/23  0808 08/05/19  1214   CHOL 159 141   HDL 43 52   LDL  91 79   TRIG 124 51       No results found for: [id number], [id number], [id number]    I personally reviewed the patient's outside records from [facility] EMR. Summary of pertinent findings in HPI.    Impression / Plan       1) concern for Hypogonadism  To confirm low testosterone and evaluate etiology:     -- Check early AM total testosterone x2, and free T by equilibrium dialysis and SHBG     -- Check LH and FSH          -- Depending on etiology of hypogonadism, will need further evaluation (iron studies, MRI brain, etc). He is not obese, does not have sleep apnea (no snoring). If hypogonadotropic hypogonadism, will complete evaluation for these things while also starting testosterone replacement. He does not have any major contraindications to testosterone therapy.         We discussed the risks and benefits of testosterone therapy at length today including expected improvements from testosterone use (libido, erectile function, bone density) and that the side effects and specifically cardiovascular effects of testosterone are not fully known. The relationship between T administration and the risk of prostate cancer remains poorly understood. We will monitor for prostate cancer by checking PSA.     Testosterone therapy can also increase lower urinary tract symptoms. . Worsen untreated DANIELA. Common drug-related adverse events include acne, oiliness of skin, and breast tenderness.    We discussed fertility issue once on exogenous testosterone.    I reviewed the need to monitor hematocrit as elevated hematocrit is a known side effect and can cause increased blood viscosity and thereby increase the risk of stroke or thromboembolism.       If levels come back again within normal, we discussed continue working up for other etiologies with his PCP. Consider sleep study and weight loss.        Test and/or medications prescribed today:  No orders of the defined types were placed in this encounter.        Follow up: pending  above      Gregory Up MD  Endocrinology, Diabetes and Metabolism  [facility]

## 2023-10-10 ENCOUNTER — LAB (OUTPATIENT)
Dept: LAB | Facility: CLINIC | Age: 28
End: 2023-10-10
Payer: COMMERCIAL

## 2023-10-10 DIAGNOSIS — R68.82 LOW LIBIDO: ICD-10-CM

## 2023-10-10 LAB
SHBG SERPL-SCNC: 17 NMOL/L (ref 11–80)
SHBG SERPL-SCNC: 17 NMOL/L (ref 11–80)
TSH SERPL DL<=0.005 MIU/L-ACNC: 1.48 UIU/ML (ref 0.3–4.2)

## 2023-10-10 PROCEDURE — 84403 ASSAY OF TOTAL TESTOSTERONE: CPT

## 2023-10-10 PROCEDURE — 36415 COLL VENOUS BLD VENIPUNCTURE: CPT

## 2023-10-10 PROCEDURE — 84270 ASSAY OF SEX HORMONE GLOBUL: CPT

## 2023-10-10 PROCEDURE — 84443 ASSAY THYROID STIM HORMONE: CPT

## 2023-10-13 LAB
TESTOST FREE SERPL-MCNC: 7.77 NG/DL
TESTOST SERPL-MCNC: 286 NG/DL (ref 240–950)

## 2024-01-08 ENCOUNTER — OFFICE VISIT (OUTPATIENT)
Dept: FAMILY MEDICINE | Facility: CLINIC | Age: 29
End: 2024-01-08
Payer: COMMERCIAL

## 2024-01-08 VITALS
OXYGEN SATURATION: 98 % | RESPIRATION RATE: 18 BRPM | BODY MASS INDEX: 27.23 KG/M2 | WEIGHT: 219 LBS | TEMPERATURE: 98.9 F | HEIGHT: 75 IN | SYSTOLIC BLOOD PRESSURE: 130 MMHG | HEART RATE: 62 BPM | DIASTOLIC BLOOD PRESSURE: 86 MMHG

## 2024-01-08 DIAGNOSIS — R00.2 PALPITATIONS: Primary | ICD-10-CM

## 2024-01-08 DIAGNOSIS — F41.9 ANXIETY: ICD-10-CM

## 2024-01-08 DIAGNOSIS — R68.82 LOW LIBIDO: ICD-10-CM

## 2024-01-08 PROCEDURE — 99214 OFFICE O/P EST MOD 30 MIN: CPT | Performed by: FAMILY MEDICINE

## 2024-01-08 RX ORDER — METOPROLOL SUCCINATE 50 MG/1
50 TABLET, EXTENDED RELEASE ORAL DAILY
Qty: 60 TABLET | Refills: 3 | Status: SHIPPED | OUTPATIENT
Start: 2024-01-08 | End: 2024-02-27

## 2024-01-08 ASSESSMENT — PAIN SCALES - GENERAL: PAINLEVEL: NO PAIN (0)

## 2024-01-08 ASSESSMENT — PATIENT HEALTH QUESTIONNAIRE - PHQ9
10. IF YOU CHECKED OFF ANY PROBLEMS, HOW DIFFICULT HAVE THESE PROBLEMS MADE IT FOR YOU TO DO YOUR WORK, TAKE CARE OF THINGS AT HOME, OR GET ALONG WITH OTHER PEOPLE: SOMEWHAT DIFFICULT
SUM OF ALL RESPONSES TO PHQ QUESTIONS 1-9: 6
SUM OF ALL RESPONSES TO PHQ QUESTIONS 1-9: 6

## 2024-01-08 NOTE — PROGRESS NOTES
"S :Ovidio Burns is a 28 year old male with palpitations.  Years.  Propranolol hasn't really helped.  Energy isn't great.  Holter with some PVCs, mild.      Has had CT chest for cp, negative.      Anxiety: didn't like celexa.  Not on meds now.  \"I've always been more anxious\"    Heart worries him, makes anxiety w orse, makes heart worse, etc.    Low libido: a couple years.  Low normal testosterone.  Endocrine  refused to prescribe tesosterone.  \"Low libido has been  a big change, I used to have a normal drive.  It affects my relationship with my wife\"      Problem list, med list, additional histories reviewed and updated, as indicated.      O:/86   Pulse 62   Temp 98.9  F (37.2  C) (Tympanic)   Resp 18   Ht 1.905 m (6' 3\")   Wt 99.3 kg (219 lb)   SpO2 98%   BMI 27.37 kg/m    GEN: Alert and oriented, in no acute distress  CV: RRR, no murmur  EXT: no edema or lesions noted in lower extremities    A; palpiations, nos       Anxiety, chronic         Low libido, ongoing    P: stop propranolol.  Move to bid on metoprolol XL to give constant blood levels.    Back in a month.      If testosterone remains below 300 and libido continues to be bad, I think consideration of replacement is reasonable.    "

## 2024-02-22 ENCOUNTER — OFFICE VISIT (OUTPATIENT)
Dept: FAMILY MEDICINE | Facility: CLINIC | Age: 29
End: 2024-02-22
Payer: COMMERCIAL

## 2024-02-22 VITALS
DIASTOLIC BLOOD PRESSURE: 84 MMHG | RESPIRATION RATE: 20 BRPM | SYSTOLIC BLOOD PRESSURE: 124 MMHG | HEIGHT: 75 IN | BODY MASS INDEX: 27.06 KG/M2 | TEMPERATURE: 97.8 F | HEART RATE: 68 BPM | OXYGEN SATURATION: 97 % | WEIGHT: 217.6 LBS

## 2024-02-22 DIAGNOSIS — R00.2 PALPITATIONS: Primary | ICD-10-CM

## 2024-02-22 PROCEDURE — 99213 OFFICE O/P EST LOW 20 MIN: CPT | Performed by: FAMILY MEDICINE

## 2024-02-22 ASSESSMENT — PAIN SCALES - GENERAL: PAINLEVEL: NO PAIN (0)

## 2024-02-22 NOTE — PROGRESS NOTES
"S: Ovidio BOOTH Grant is a 28 year old male with palpitations.  He feels they've gotten worse over last few years.     A little better with switch from propranolol to metoprolol, but still bothering    Would like to see cardiology.     O:/84 (BP Location: Right arm, Patient Position: Sitting, Cuff Size: Adult Large)   Pulse 68   Temp 97.8  F (36.6  C) (Tympanic)   Resp 20   Ht 1.905 m (6' 3\")   Wt 98.7 kg (217 lb 9.6 oz)   SpO2 97%   BMI 27.20 kg/m    GEN: Alert and oriented, in no acute distress  CV: RRR, no murmur  EXT: no edema or lesions noted in lower extremities    A: palpitations ,ongoing    P: ziopatch and cards consult.  Reasonable as this is bothering him and he would like to speak to a specialist about a better plan.    "

## 2024-02-23 ENCOUNTER — ORDERS ONLY (AUTO-RELEASED) (OUTPATIENT)
Dept: FAMILY MEDICINE | Facility: CLINIC | Age: 29
End: 2024-02-23
Payer: COMMERCIAL

## 2024-02-23 DIAGNOSIS — R00.2 PALPITATIONS: ICD-10-CM

## 2024-02-27 ENCOUNTER — TELEPHONE (OUTPATIENT)
Dept: FAMILY MEDICINE | Facility: CLINIC | Age: 29
End: 2024-02-27
Payer: COMMERCIAL

## 2024-02-27 DIAGNOSIS — R00.2 PALPITATIONS: ICD-10-CM

## 2024-02-27 RX ORDER — METOPROLOL SUCCINATE 50 MG/1
50 TABLET, EXTENDED RELEASE ORAL 2 TIMES DAILY
Qty: 180 TABLET | Refills: 0 | Status: SHIPPED | OUTPATIENT
Start: 2024-02-27 | End: 2024-06-17

## 2024-02-27 NOTE — TELEPHONE ENCOUNTER
Medication request     What medication are you calling about (include dose and sig)?: Metoprolol Succinate ER (Toprol XL)   Pt is currently taking 50 mg 2 times daily.  Pt is asking qty of 90 days vs one month.     Preferred Pharmacy:       [facility] Replaced by [facility] [place], [place] - [address]  [address] [address]  Phone: [phone] Fax: [phone]      Controlled Substance Agreement on file:   CSA -- Patient Level:    CSA: None found at the patient level.         Could we send this information to you in [employer] or would you prefer to receive a phone call?:   Patient would prefer a phone call   Okay to leave a detailed message?: Yes at Home number on file [phone] (home)    [facility]

## 2024-02-27 NOTE — TELEPHONE ENCOUNTER
From 1/8/24 visit with DR Camara:    stop propranolol. Move to bid on metoprolol XL to give constant blood levels.     New rx with updated sig sent to pharmacy

## 2024-03-11 ENCOUNTER — TELEPHONE (OUTPATIENT)
Dept: FAMILY MEDICINE | Facility: CLINIC | Age: 29
End: 2024-03-11
Payer: COMMERCIAL

## 2024-03-11 DIAGNOSIS — R00.2 PALPITATIONS: Primary | ICD-10-CM

## 2024-03-11 NOTE — TELEPHONE ENCOUNTER
Called and left message for patient to call clinic back.    We received a message from [employer] stating that his zio patch fell off after one day of wear. We will place a new order to mail him another zio patch.     Sameera Salas MA

## 2024-03-12 ENCOUNTER — ORDERS ONLY (AUTO-RELEASED) (OUTPATIENT)
Dept: FAMILY MEDICINE | Facility: CLINIC | Age: 29
End: 2024-03-12
Payer: COMMERCIAL

## 2024-03-12 DIAGNOSIS — R00.2 PALPITATIONS: ICD-10-CM

## 2024-03-14 PROCEDURE — 93227 XTRNL ECG REC<48 HR R&I: CPT | Performed by: INTERNAL MEDICINE

## 2024-04-08 PROCEDURE — 93244 EXT ECG>48HR<7D REV&INTERPJ: CPT | Performed by: INTERNAL MEDICINE

## 2024-04-08 NOTE — PROGRESS NOTES
CARDIOLOGY CONSULT    REASON FOR CONSULT: Palpitations    PRIMARY CARE PHYSICIAN:  Allan Camara    HISTORY OF PRESENT ILLNESS:  28-year-old male seen for palpitations.    Zio monitor 2021 was normal, rare ectopy.    He reports palpitations about 5 years.  They have become gradually more frequent.  He will now feel it most days, sometimes multiple times per day.  He describes a skipping sensation, then a strong beat lasting 1 or 2 seconds.  He can have a few in a row which causes a mild discomfort in his chest.  He has no lightheadedness or syncope.  Symptoms can be worse after alcohol use or with dehydration.  He will do some regular exercise, symptoms do not really worsen at that time.  He was on propranolol which did not help much, symptoms are about the same now on metoprolol.    He has some EKG tracings today from his Apple Watch, these show PVCs that correlate with his symptoms.    7-day ZIO monitor March 2024 showed sinus rhythm, rare ectopy, a few symptoms correlated with single PVCs, several symptoms during sinus.    PAST MEDICAL HISTORY:  Past Medical History:   Diagnosis Date    Exercise-induced asthma 4/28/2009    Other abnormal heart sounds At Birth    Heart Murmur/neg. Echo       MEDICATIONS:  Current Outpatient Medications   Medication Sig Dispense Refill    metoprolol succinate ER (TOPROL XL) 50 MG 24 hr tablet Take 1 tablet (50 mg) by mouth 2 times daily 180 tablet 0     No current facility-administered medications for this visit.       ALLERGIES:  Allergies   Allergen Reactions    Lisinopril      Numb fingers       SOCIAL HISTORY:  I have reviewed this patient's social history and updated it with pertinent information if needed. Ovidio Burns  reports that he has quit smoking. His smoking use included dip, chew, snus or snuff. His smokeless tobacco use includes chew. He reports current alcohol use. He reports that he does not use drugs.    FAMILY HISTORY:  I have reviewed this patient's family  "history and updated it with pertinent information if needed.   Family History   Problem Relation Age of Onset    Hypertension Maternal Grandmother     Breast Cancer Paternal Grandmother     Diabetes Paternal Grandfather     Hypertension Paternal Grandfather     Depression Mother     Hypertension Father     Cerebrovascular Disease Maternal Grandfather     Heart Disease Maternal Grandfather         MIs and CABG    Mental Illness Brother         anxiety       REVIEW OF SYSTEMS:  Constitutional:  No weight loss, fever, chills  HEENT:  Eyes:  No visual loss, blurred vision, double vision or yellow sclerae. No hearing loss, sneezing, congestion, runny nose or sore throat.  Skin:  No rash or itching.  Cardiovascular: per HPI  Respiratory: per HPI  GI:  No anorexia, nausea, vomiting or diarrhea. No abdominal pain or blood.  :  No dysurea, hematuria  Neurologic:  No headache, paralysis, ataxia, numbness or tingling in the extremities. No change in bowel or bladder control.  Musculoskeletal:  No muscle pain  Hematologic:  No bleeding or bruising.  Lymphatics:  No enlarged nodes. No history of splenectomy.  Endocrine:  No reports of sweating, cold or heat intolerance. No polyuria or polydipsia.  Allergies:  No history of asthma, hives, eczema or rhinitis.    PHYSICAL EXAM:  /78   Pulse 66   Resp 20   Ht 1.905 m (6' 3\")   Wt 97.3 kg (214 lb 9.6 oz)   SpO2 95%   BMI 26.82 kg/m    Constitutional: awake, alert, no distress  Eyes: PERRL, sclera nonicteric  ENT: trachea midline  Respiratory: Lungs clear  Cardiovascular: Regular rate and rhythm, no murmur, no ectopy  GI: nondistended, nontender, bowel sounds present  Lymph/Hematologic: no lymphadenopathy  Skin: dry, no rash  Musculoskeletal: good muscle tone, strength 5/5 in upper and lower extremities  Neurologic: no focal deficits  Neuropsychiatric: appropriate affact    DATA:  Labs:   Recent Labs   Lab Test 04/10/23  0808 08/05/19  1214   CHOL 159 141   HDL 43 52 "   LDL 91 79   TRIG 124 51     EKG: April 10: Sinus rhythm, normal EKG, no ectopy    ASSESSMENT:  28-year-old male seen for palpitations.  He seems to be feeling PVCs.  The overall burden seems to be low, but he is quite symptomatic.  We talked about possible etiologies.  He will try to minimize any triggers such as alcohol or dehydration.  Stress echo will be done to assess baseline ejection fraction, rule out any structural heart disease, and make sure there is no increased arrhythmia with exercise or ischemia from coronary anomaly or something similar.    Assuming testing is normal, cardiac MRI could be considered.  He could also try diltiazem in place of metoprolol, which sometimes works better.    RECOMMENDATIONS:  1.  Palpitations, secondary to PVCs  -Continue metoprolol for now, but hold prior to stress test  -Treadmill stress echo  -Consider trial of diltiazem in place of metoprolol  -If symptoms continue, consider cardiac MRI    Follow-up in about 3 months with DEDRA.    Juan Mayo MD  Cardiology - [facility]  Pager:  [phone]  Text Page  April 10, 2024

## 2024-04-10 ENCOUNTER — OFFICE VISIT (OUTPATIENT)
Dept: CARDIOLOGY | Facility: CLINIC | Age: 29
End: 2024-04-10
Attending: FAMILY MEDICINE
Payer: COMMERCIAL

## 2024-04-10 VITALS
DIASTOLIC BLOOD PRESSURE: 78 MMHG | OXYGEN SATURATION: 95 % | HEART RATE: 66 BPM | SYSTOLIC BLOOD PRESSURE: 121 MMHG | WEIGHT: 214.6 LBS | HEIGHT: 75 IN | BODY MASS INDEX: 26.68 KG/M2 | RESPIRATION RATE: 20 BRPM

## 2024-04-10 DIAGNOSIS — R00.2 PALPITATIONS: Primary | ICD-10-CM

## 2024-04-10 DIAGNOSIS — I49.3 PVC'S (PREMATURE VENTRICULAR CONTRACTIONS): ICD-10-CM

## 2024-04-10 DIAGNOSIS — R07.89 ATYPICAL CHEST PAIN: ICD-10-CM

## 2024-04-10 PROBLEM — M54.6 THORACIC BACK PAIN: Status: ACTIVE | Noted: 2017-05-23

## 2024-04-10 PROBLEM — Z72.0 TOBACCO ABUSE: Status: ACTIVE | Noted: 2017-05-23

## 2024-04-10 PROBLEM — F10.10 ALCOHOL CONSUMPTION BINGE DRINKING: Status: ACTIVE | Noted: 2017-05-23

## 2024-04-10 PROBLEM — S39.92XA BACK INJURY: Status: ACTIVE | Noted: 2017-03-01

## 2024-04-10 PROCEDURE — 93000 ELECTROCARDIOGRAM COMPLETE: CPT | Performed by: INTERNAL MEDICINE

## 2024-04-10 PROCEDURE — 99204 OFFICE O/P NEW MOD 45 MIN: CPT | Performed by: INTERNAL MEDICINE

## 2024-04-10 NOTE — LETTER
4/10/2024    Allan Camara MD  [address] [place] [id number]    RE: Ovidio Burns       Dear Colleague,     I had the pleasure of seeing Ovidio Burns in the [facility].  CARDIOLOGY CONSULT    REASON FOR CONSULT: Palpitations    PRIMARY CARE PHYSICIAN:  Allan Camara    HISTORY OF PRESENT ILLNESS:  28-year-old male seen for palpitations.    Zio monitor 2021 was normal, rare ectopy.    He reports palpitations about 5 years.  They have become gradually more frequent.  He will now feel it most days, sometimes multiple times per day.  He describes a skipping sensation, then a strong beat lasting 1 or 2 seconds.  He can have a few in a row which causes a mild discomfort in his chest.  He has no lightheadedness or syncope.  Symptoms can be worse after alcohol use or with dehydration.  He will do some regular exercise, symptoms do not really worsen at that time.  He was on propranolol which did not help much, symptoms are about the same now on metoprolol.    He has some EKG tracings today from his Apple Watch, these show PVCs that correlate with his symptoms.    7-day ZIO monitor March 2024 showed sinus rhythm, rare ectopy, a few symptoms correlated with single PVCs, several symptoms during sinus.    PAST MEDICAL HISTORY:  Past Medical History:   Diagnosis Date    Exercise-induced asthma 4/28/2009    Other abnormal heart sounds At Birth    Heart Murmur/neg. Echo       MEDICATIONS:  Current Outpatient Medications   Medication Sig Dispense Refill    metoprolol succinate ER (TOPROL XL) 50 MG 24 hr tablet Take 1 tablet (50 mg) by mouth 2 times daily 180 tablet 0     No current facility-administered medications for this visit.       ALLERGIES:  Allergies   Allergen Reactions    Lisinopril      Numb fingers       SOCIAL HISTORY:  I have reviewed this patient's social history and updated it with pertinent information if needed. Ovidio Burns  reports that he has quit smoking. His smoking  "use included dip, chew, snus or snuff. His smokeless tobacco use includes chew. He reports current alcohol use. He reports that he does not use drugs.    FAMILY HISTORY:  I have reviewed this patient's family history and updated it with pertinent information if needed.   Family History   Problem Relation Age of Onset    Hypertension Maternal Grandmother     Breast Cancer Paternal Grandmother     Diabetes Paternal Grandfather     Hypertension Paternal Grandfather     Depression Mother     Hypertension Father     Cerebrovascular Disease Maternal Grandfather     Heart Disease Maternal Grandfather         MIs and CABG    Mental Illness Brother         anxiety       REVIEW OF SYSTEMS:  Constitutional:  No weight loss, fever, chills  HEENT:  Eyes:  No visual loss, blurred vision, double vision or yellow sclerae. No hearing loss, sneezing, congestion, runny nose or sore throat.  Skin:  No rash or itching.  Cardiovascular: per HPI  Respiratory: per HPI  GI:  No anorexia, nausea, vomiting or diarrhea. No abdominal pain or blood.  :  No dysurea, hematuria  Neurologic:  No headache, paralysis, ataxia, numbness or tingling in the extremities. No change in bowel or bladder control.  Musculoskeletal:  No muscle pain  Hematologic:  No bleeding or bruising.  Lymphatics:  No enlarged nodes. No history of splenectomy.  Endocrine:  No reports of sweating, cold or heat intolerance. No polyuria or polydipsia.  Allergies:  No history of asthma, hives, eczema or rhinitis.    PHYSICAL EXAM:  /78   Pulse 66   Resp 20   Ht 1.905 m (6' 3\")   Wt 97.3 kg (214 lb 9.6 oz)   SpO2 95%   BMI 26.82 kg/m    Constitutional: awake, alert, no distress  Eyes: PERRL, sclera nonicteric  ENT: trachea midline  Respiratory: Lungs clear  Cardiovascular: Regular rate and rhythm, no murmur, no ectopy  GI: nondistended, nontender, bowel sounds present  Lymph/Hematologic: no lymphadenopathy  Skin: dry, no rash  Musculoskeletal: good muscle tone, " strength 5/5 in upper and lower extremities  Neurologic: no focal deficits  Neuropsychiatric: appropriate affact    DATA:  Labs:   Recent Labs   Lab Test 04/10/23  0808 08/05/19  1214   CHOL 159 141   HDL 43 52   LDL 91 79   TRIG 124 51     EKG: April 10: Sinus rhythm, normal EKG, no ectopy    ASSESSMENT:  28-year-old male seen for palpitations.  He seems to be feeling PVCs.  The overall burden seems to be low, but he is quite symptomatic.  We talked about possible etiologies.  He will try to minimize any triggers such as alcohol or dehydration.  Stress echo will be done to assess baseline ejection fraction, rule out any structural heart disease, and make sure there is no increased arrhythmia with exercise or ischemia from coronary anomaly or something similar.    Assuming testing is normal, cardiac MRI could be considered.  He could also try diltiazem in place of metoprolol, which sometimes works better.    RECOMMENDATIONS:  1.  Palpitations, secondary to PVCs  -Continue metoprolol for now, but hold prior to stress test  -Treadmill stress echo  -Consider trial of diltiazem in place of metoprolol  -If symptoms continue, consider cardiac MRI    Follow-up in about 3 months with DEDRA.    Juan Mayo MD  Cardiology - [facility]  Pager:  [phone]  Text Page  April 10, 2024        Thank you for allowing me to participate in the care of your patient.      Sincerely,     Juan Mayo MD     [facility]  cc:   Allan Camara MD  [address] [place] [id number]

## 2024-04-10 NOTE — PATIENT INSTRUCTIONS
Treadmill stress echocardiogram  Will schedule a treadmill echo stress test.  This is a test where we take some baseline pictures of your heart with an ultrasound (echocardiogram).  You will then exercise on the treadmill while hooked up to an EKG machine.  We monitored you heart rate and blood pressure.  We will have you exercise long enough to reach a certain target heart rate.  Immediately after exercise, additional pictures are taken of your heart with the ultrasound.    If there are any severe blockages in the heart, there may be changes on the EKG or the heart pictures may look abnormal.  If the test is abnormal, often a coronary angiogram is recommended at a later time.  This is the definitive test looking for blockages in the heart and potentially fixing them with stents.    Overall the test takes 30-45 minutes.  You will be on the treadmill for 5-12 minutes.  Wear some comfortable clothes and shoes for doing some light to moderate exercise.    Your test will be at [place].

## 2024-05-23 ENCOUNTER — HOSPITAL ENCOUNTER (OUTPATIENT)
Dept: CARDIOLOGY | Facility: CLINIC | Age: 29
Discharge: HOME | End: 2024-05-23
Attending: INTERNAL MEDICINE | Admitting: INTERNAL MEDICINE
Payer: COMMERCIAL

## 2024-05-23 DIAGNOSIS — R07.89 ATYPICAL CHEST PAIN: ICD-10-CM

## 2024-05-23 DIAGNOSIS — I49.3 PVC'S (PREMATURE VENTRICULAR CONTRACTIONS): ICD-10-CM

## 2024-05-23 PROCEDURE — 93350 STRESS TTE ONLY: CPT | Mod: 26 | Performed by: INTERNAL MEDICINE

## 2024-05-23 PROCEDURE — 93325 DOPPLER ECHO COLOR FLOW MAPG: CPT | Mod: 26 | Performed by: INTERNAL MEDICINE

## 2024-05-23 PROCEDURE — 93325 DOPPLER ECHO COLOR FLOW MAPG: CPT | Mod: TC

## 2024-05-23 PROCEDURE — 93321 DOPPLER ECHO F-UP/LMTD STD: CPT | Mod: 26 | Performed by: INTERNAL MEDICINE

## 2024-05-23 PROCEDURE — 93018 CV STRESS TEST I&R ONLY: CPT | Performed by: INTERNAL MEDICINE

## 2024-05-23 PROCEDURE — 93016 CV STRESS TEST SUPVJ ONLY: CPT | Performed by: INTERNAL MEDICINE

## 2024-05-23 PROCEDURE — 93350 STRESS TTE ONLY: CPT | Mod: TC

## 2024-05-28 ENCOUNTER — MYC MEDICAL ADVICE (OUTPATIENT)
Dept: CARDIOLOGY | Facility: CLINIC | Age: 29
End: 2024-05-28
Payer: COMMERCIAL

## 2024-06-16 ENCOUNTER — HEALTH MAINTENANCE LETTER (OUTPATIENT)
Age: 29
End: 2024-06-16

## 2024-06-17 ENCOUNTER — MYC REFILL (OUTPATIENT)
Dept: FAMILY MEDICINE | Facility: CLINIC | Age: 29
End: 2024-06-17
Payer: COMMERCIAL

## 2024-06-17 DIAGNOSIS — R00.2 PALPITATIONS: ICD-10-CM

## 2024-06-18 NOTE — TELEPHONE ENCOUNTER
Last Written Prescription Date:  02/27/24  Last Fill Quantity: 180,  # refills: 0   Last office visit: 2/22/2024 ; last virtual visit: Visit date not found with prescribing provider:     Future Office Visit:  07/10/24 with cardiology    Routing refill request to provider for review/approval because:  Protocol fails: Medication indicated for associated diagnosis    Luz Marina MARSHALL RN

## 2024-06-18 NOTE — TELEPHONE ENCOUNTER
Patient called to check on status of refill.   Leaving [facility] Friday.     Jaclyn HARRIS  Station

## 2024-06-19 RX ORDER — METOPROLOL SUCCINATE 50 MG/1
50 TABLET, EXTENDED RELEASE ORAL 2 TIMES DAILY
Qty: 180 TABLET | Refills: 2 | Status: SHIPPED | OUTPATIENT
Start: 2024-06-19 | End: 2024-08-07 | Stop reason: ALTCHOICE

## 2024-07-10 ENCOUNTER — OFFICE VISIT (OUTPATIENT)
Dept: CARDIOLOGY | Facility: CLINIC | Age: 29
End: 2024-07-10
Attending: INTERNAL MEDICINE
Payer: COMMERCIAL

## 2024-07-10 VITALS
HEART RATE: 76 BPM | RESPIRATION RATE: 12 BRPM | WEIGHT: 208 LBS | BODY MASS INDEX: 26 KG/M2 | DIASTOLIC BLOOD PRESSURE: 84 MMHG | SYSTOLIC BLOOD PRESSURE: 131 MMHG | OXYGEN SATURATION: 98 %

## 2024-07-10 DIAGNOSIS — I49.3 PVC'S (PREMATURE VENTRICULAR CONTRACTIONS): ICD-10-CM

## 2024-07-10 PROCEDURE — 99214 OFFICE O/P EST MOD 30 MIN: CPT | Performed by: NURSE PRACTITIONER

## 2024-07-10 NOTE — PROGRESS NOTES
Cardiology Clinic Progress Note  Ovidio Burns MRN# [id number]   YOB: 1995 Age: 28 year old      Primary Cardiologist:   Dr. Mayo, 3-month follow-up          History of Presenting Illness:      Patient was seen in consultation with Dr. Mayo in April 2024.  He noted ongoing palpitations over the prior 5 years that were becoming more frequent, sometimes multiple times per day.  Symptoms were not significantly improved with propranolol or metoprolol.  Stress echo recommended for further workup. Could consider cardiac MRI, if stress test was normal.    Most recent lipid profile, BMP, TSH reviewed today. Stress echo May 2024 showing no ischemia or infarction and PVCs improved with exercise, echo with normal LVEF.  Results reviewed today.    He notes that his palpitations have worsened over the last year.  He and his wife recently had their first child.  He notes that he notices palpitations mostly in the evenings while laying down to sleep.  He uses nicotine pouches in place of chewing tobacco.  We discussed lifestyle modification and gave reassurance regarding PVCs.  I offered to increase metoprolol or switch to diltiazem, but he prefers to work on lifestyle modification first.  He is interested in further workup with a cardiac MRI as previously recommended at Dr. Mayo' appointment. Patient reports no chest pain, shortness of breath, PND, orthopnea, presyncope, syncope, edema, heart racing.                    Assessment and Plan:     Plan  Patient Instructions   Medication Changes:  None     Recommendations:  Call with questions  Work on quitting nicotine and decreasing alcohol, caffeine, and stress     Follow-up:  Cardiology follow up at [facility]: Dr. Mayo in 5-6 months.   Call if you want to switch to diltiazem or increase metoprolol   Call to schedule Cardiac MRI at [facility]     Cardiology Scheduling~[phone]  Cardiology Clinic RN~[phone] (Ellen RN, Lary RN;  Jacquelyn BUSBY)          Problem List as of 7/10/2024 Reviewed: 12/9/2020  3:08 PM by Alfredito Stephenson MD            Noted       Active Problems    1. PVC's (premature ventricular contractions) 7/10/2024     Overview Signed 7/10/2024  4:11 PM by Dalila Gonsalez APRN CNP      Felt as palpitations stronger beat and skipping sensation   Apple Watch showing PVCs correlating with symptoms  7-day Zio patch 3/2024 sinus rhythm, rare ectopy few symptoms correlated with single PVCs but patient had several symptoms during sinus rhythm.          Last Assessment & Plan 7/10/2024 Office Visit Written 7/10/2024  4:11 PM by Dalila Gonsalez APRN CNP      Continue metoprolol  Lifestyle modification          Relevant Orders     Follow-Up with Cardiology     MRI Cardiac w/contrast             Respiratory:  clear to auscultation; normal symmetry        Cardiac: regular rate and rhythm     GI:  abdomen nondistended     Extremities and Muscular Skeletal:   no edema                Thank you for allowing me to participate in this delightful patient's care.   This note was completed in part using Dragon voice recognition software. Although reviewed after completion, some word and grammatical errors may occur.    LUDIVINA Harris CNP

## 2024-07-10 NOTE — PATIENT INSTRUCTIONS
Medication Changes:  None     Recommendations:  Call with questions  Work on quitting nicotine and decreasing alcohol, caffeine, and stress     Follow-up:  Cardiology follow up at [facility]: Dr. Mayo in 5-6 months.   Call if you want to switch to diltiazem or increase metoprolol   Call to schedule Cardiac MRI at [facility]     Cardiology Scheduling~[phone]  Cardiology Clinic RN~[phone] (Ellen RN, Lary RN; Jacquelyn RN)

## 2024-07-10 NOTE — LETTER
7/10/2024    Allan Camara MD  [address] [place] [id number]    RE: Ovidio Burns       Dear Colleague,     I had the pleasure of seeing Ovidio Burns in the [facility].  Cardiology Clinic Progress Note  Ovidio Burns MRN# [id number]   YOB: 1995 Age: 28 year old      Primary Cardiologist:   Dr. Mayo, 3-month follow-up          History of Presenting Illness:      Patient was seen in consultation with Dr. Mayo in April 2024.  He noted ongoing palpitations over the prior 5 years that were becoming more frequent, sometimes multiple times per day.  Symptoms were not significantly improved with propranolol or metoprolol.  Stress echo recommended for further workup. Could consider cardiac MRI, if stress test was normal.    Most recent lipid profile, BMP, TSH reviewed today. Stress echo May 2024 showing no ischemia or infarction and PVCs improved with exercise, echo with normal LVEF.  Results reviewed today.    He notes that his palpitations have worsened over the last year.  He and his wife recently had their first child.  He notes that he notices palpitations mostly in the evenings while laying down to sleep.  He uses nicotine pouches in place of chewing tobacco.  We discussed lifestyle modification and gave reassurance regarding PVCs.  I offered to increase metoprolol or switch to diltiazem, but he prefers to work on lifestyle modification first.  He is interested in further workup with a cardiac MRI as previously recommended at Dr. Mayo' appointment. Patient reports no chest pain, shortness of breath, PND, orthopnea, presyncope, syncope, edema, heart racing.                    Assessment and Plan:     Plan  Patient Instructions   Medication Changes:  None     Recommendations:  Call with questions  Work on quitting nicotine and decreasing alcohol, caffeine, and stress     Follow-up:  Cardiology follow up at [facility]: Dr. Mayo in 5-6 months.    Call if you want to switch to diltiazem or increase metoprolol   Call to schedule Cardiac MRI at [facility]     Cardiology Scheduling~[phone]  Cardiology Clinic RN~[phone] (Ellen RN, Lary RN; Jacquelyn RN)          Problem List as of 7/10/2024 Reviewed: 12/9/2020  3:08 PM by Alfredito Stephenson MD            Noted       Active Problems    1. PVC's (premature ventricular contractions) 7/10/2024     Overview Signed 7/10/2024  4:11 PM by Dalila Gonsalez APRN CNP      Felt as palpitations stronger beat and skipping sensation   Apple Watch showing PVCs correlating with symptoms  7-day Zio patch 3/2024 sinus rhythm, rare ectopy few symptoms correlated with single PVCs but patient had several symptoms during sinus rhythm.          Last Assessment & Plan 7/10/2024 Office Visit Written 7/10/2024  4:11 PM by Dalila Gonsalez APRN CNP      Continue metoprolol  Lifestyle modification          Relevant Orders     Follow-Up with Cardiology     MRI Cardiac w/contrast             Respiratory:  clear to auscultation; normal symmetry        Cardiac: regular rate and rhythm     GI:  abdomen nondistended     Extremities and Muscular Skeletal:   no edema                Thank you for allowing me to participate in this delightful patient's care.   This note was completed in part using Dragon voice recognition software. Although reviewed after completion, some word and grammatical errors may occur.    LUDIVINA Harris CNP      Thank you for allowing me to participate in the care of your patient.      Sincerely,     LUDIVINA Harris CNP     [facility]  cc:   Juan Mayo MD

## 2024-07-29 ENCOUNTER — OFFICE VISIT (OUTPATIENT)
Dept: FAMILY MEDICINE | Facility: CLINIC | Age: 29
End: 2024-07-29
Payer: COMMERCIAL

## 2024-07-29 VITALS
DIASTOLIC BLOOD PRESSURE: 88 MMHG | TEMPERATURE: 97.8 F | HEIGHT: 75 IN | OXYGEN SATURATION: 98 % | SYSTOLIC BLOOD PRESSURE: 136 MMHG | HEART RATE: 64 BPM | WEIGHT: 212 LBS | RESPIRATION RATE: 16 BRPM | BODY MASS INDEX: 26.36 KG/M2

## 2024-07-29 DIAGNOSIS — R79.89 DECREASED TESTOSTERONE LEVEL: ICD-10-CM

## 2024-07-29 DIAGNOSIS — I49.3 PVC'S (PREMATURE VENTRICULAR CONTRACTIONS): ICD-10-CM

## 2024-07-29 DIAGNOSIS — Z00.00 WELL ADULT EXAM: Primary | ICD-10-CM

## 2024-07-29 DIAGNOSIS — J45.990 EXERCISE-INDUCED ASTHMA: ICD-10-CM

## 2024-07-29 DIAGNOSIS — R68.82 LOW LIBIDO: ICD-10-CM

## 2024-07-29 PROBLEM — S39.92XA BACK INJURY: Status: RESOLVED | Noted: 2017-03-01 | Resolved: 2024-07-29

## 2024-07-29 PROBLEM — F10.10 ALCOHOL CONSUMPTION BINGE DRINKING: Status: RESOLVED | Noted: 2017-05-23 | Resolved: 2024-07-29

## 2024-07-29 PROBLEM — R00.2 PALPITATIONS: Status: RESOLVED | Noted: 2024-01-08 | Resolved: 2024-07-29

## 2024-07-29 PROBLEM — Z72.0 TOBACCO ABUSE: Status: RESOLVED | Noted: 2017-05-23 | Resolved: 2024-07-29

## 2024-07-29 PROCEDURE — 84403 ASSAY OF TOTAL TESTOSTERONE: CPT | Performed by: FAMILY MEDICINE

## 2024-07-29 PROCEDURE — 36415 COLL VENOUS BLD VENIPUNCTURE: CPT | Performed by: FAMILY MEDICINE

## 2024-07-29 PROCEDURE — 99395 PREV VISIT EST AGE 18-39: CPT | Mod: 25 | Performed by: FAMILY MEDICINE

## 2024-07-29 PROCEDURE — 99214 OFFICE O/P EST MOD 30 MIN: CPT | Mod: 25 | Performed by: FAMILY MEDICINE

## 2024-07-29 RX ORDER — ALBUTEROL SULFATE 90 UG/1
2 AEROSOL, METERED RESPIRATORY (INHALATION) EVERY 6 HOURS PRN
Qty: 18 G | Refills: 3 | Status: SHIPPED | OUTPATIENT
Start: 2024-07-29

## 2024-07-29 SDOH — HEALTH STABILITY: PHYSICAL HEALTH: ON AVERAGE, HOW MANY MINUTES DO YOU ENGAGE IN EXERCISE AT THIS LEVEL?: 40 MIN

## 2024-07-29 SDOH — HEALTH STABILITY: PHYSICAL HEALTH: ON AVERAGE, HOW MANY DAYS PER WEEK DO YOU ENGAGE IN MODERATE TO STRENUOUS EXERCISE (LIKE A BRISK WALK)?: 4 DAYS

## 2024-07-29 ASSESSMENT — PATIENT HEALTH QUESTIONNAIRE - PHQ9
SUM OF ALL RESPONSES TO PHQ QUESTIONS 1-9: 7
10. IF YOU CHECKED OFF ANY PROBLEMS, HOW DIFFICULT HAVE THESE PROBLEMS MADE IT FOR YOU TO DO YOUR WORK, TAKE CARE OF THINGS AT HOME, OR GET ALONG WITH OTHER PEOPLE: SOMEWHAT DIFFICULT
SUM OF ALL RESPONSES TO PHQ QUESTIONS 1-9: 7

## 2024-07-29 ASSESSMENT — PAIN SCALES - GENERAL: PAINLEVEL: NO PAIN (0)

## 2024-07-29 ASSESSMENT — SOCIAL DETERMINANTS OF HEALTH (SDOH): HOW OFTEN DO YOU GET TOGETHER WITH FRIENDS OR RELATIVES?: THREE TIMES A WEEK

## 2024-07-29 NOTE — PROGRESS NOTES
"Preventive Care Visit  [facility]  Allan Camara MD, Family Medicine  Jul 29, 2024      Assessment & Plan   Well exam  Low normal testosterone  Low libido  Exercise asthma  Symptomatic PVCs     He wanted some albuterol to have around when running, wrote script.   Recheck testosterone.  If under 300 again, told him replacement was reasonable given sx, we can see how he does    Continue to follow-up with cardiology for his symptomatic PVCs        BMI  Estimated body mass index is 26.5 kg/m  as calculated from the following:    Height as of this encounter: 1.905 m (6' 3\").    Weight as of this encounter: 96.2 kg (212 lb).       Counseling  Appropriate preventive services were addressed with this patient via screening, questionnaire, or discussion as appropriate for fall prevention, nutrition, physical activity, Tobacco-use cessation, weight loss and cognition.  Checklist reviewing preventive services available has been given to the patient.  Reviewed patient's diet, addressing concerns and/or questions.   The patient's PHQ-9 score is consistent with mild depression. He was provided with information regarding depression.           Chikis Nolan is a 28 year old, presenting for the following:  Physical      Low normal testosterone, recheck.  He says he's tired, no libido, is convinced this is from his lowish testosterone  Low libido, ongoing   Exercise asthma, long hx.  Needed albuterol, noticing this more when running now  Symptomatic PVCs , stress test and echo normal.  Metoprolol doesn't do that much.  Is following up with cardiology, cardiac MRI and maybe switch to diltiazem.         7/29/2024    11:12 AM   Additional Questions   Roomed by rosanne   Accompanied by self        Health Care Directive  Patient does not have a Health Care Directive or Living Will:         7/29/2024   General Health   How would you rate your overall physical health? (!) FAIR   Feel stress (tense, anxious, " or unable to sleep) To some extent      (!) STRESS CONCERN      7/29/2024   Nutrition   Three or more servings of calcium each day? Yes   Diet: Regular (no restrictions)   How many servings of fruit and vegetables per day? (!) 2-3   How many sweetened beverages each day? 0-1            7/29/2024   Exercise   Days per week of moderate/strenous exercise 4 days   Average minutes spent exercising at this level 40 min            7/29/2024   Social Factors   Frequency of gathering with friends or relatives Three times a week   Worry food won't last until get money to buy more No   Food not last or not have enough money for food? No   Do you have housing? (Housing is defined as stable permanent housing and does not include staying ouside in a car, in a tent, in an abandoned building, in an overnight shelter, or couch-surfing.) Yes   Are you worried about losing your housing? No   Lack of transportation? No   Unable to get utilities (heat,electricity)? No            7/29/2024   Dental   Dentist two times every year? Yes            7/29/2024   TB Screening   Were you born outside of the [place]? No          Today's PHQ-9 Score:       7/29/2024    10:47 AM   PHQ-9 SCORE   PHQ-9 Total Score MyChart 7 (Mild depression)   PHQ-9 Total Score 7         7/29/2024   Substance Use   Alcohol more than 3/day or more than 7/wk No   Do you use any other substances recreationally? No        Social History     Tobacco Use    Smoking status: Former     Types: Dip, chew, snus or snuff    Smokeless tobacco: Current     Types: Chew     Last attempt to quit: 3/8/2022    Tobacco comments:     About 4 tins a week    Vaping Use    Vaping status: Never Used   Substance Use Topics    Alcohol use: Yes     Comment: 4 drinks per week    Drug use: No           7/29/2024   STI Screening   New sexual partner(s) since last STI/HIV test? No            7/29/2024   Contraception/Family Planning   Questions about contraception or family planning No          Reviewed  "and updated as needed this visit by Provider                    Gen: no unexpected wt loss or fevers.    ENT: vision ok, no hearing changes, no lumps noted in neck or mouth  CV: no chest pain or SOB,see above   RESP: No wheezing or pleuritic pain no cough  GI: no nausea or vomiting, no abd pain.  No blood in stool.   : no dysuria or hematuria. No urinary retention.  No lesions on genitals noted.    MUSC: moving all extremities, no edema  ENDO: no excessive thirst or urination.    NEURO: no difficulty speaking, no focal weakness or changes in sensation.  No balance troubles.   PSYCH: mood stable,  no significant problems with anxiety  SKIN: no worrisome rashes or lesions       Objective    Exam  /88   Pulse 64   Temp 97.8  F (36.6  C) (Tympanic)   Resp 16   Ht 1.905 m (6' 3\")   Wt 96.2 kg (212 lb)   SpO2 98%   BMI 26.50 kg/m     Estimated body mass index is 26 kg/m  as calculated from the following:    Height as of 4/10/24: 1.905 m (6' 3\").    Weight as of 7/10/24: 94.3 kg (208 lb).    Gen: alert and oriented, in no acute distress, affect within normal limits  Neck: supple with no masses or nodes  Throat: oropharynx clear, no exudate or tonsillar/palate asymmetry.    CV: RRR, no murmur  Lungs: clear bilaterally with good effort  Abd: nontender, no mass  Ext: no edema or lesions   Neuro: moving all extremities, gait normal, no focal deficts noted          Signed Electronically by: Allan Camara MD    "

## 2024-07-31 LAB — TESTOST SERPL-MCNC: 206 NG/DL (ref 240–950)

## 2024-08-05 ENCOUNTER — TELEPHONE (OUTPATIENT)
Dept: CARDIOLOGY | Facility: CLINIC | Age: 29
End: 2024-08-05
Payer: COMMERCIAL

## 2024-08-05 DIAGNOSIS — R00.2 PALPITATIONS: Primary | ICD-10-CM

## 2024-08-05 ASSESSMENT — ASTHMA QUESTIONNAIRES
ACT_TOTALSCORE: 22
QUESTION_2 LAST FOUR WEEKS HOW OFTEN HAVE YOU HAD SHORTNESS OF BREATH: ONCE OR TWICE A WEEK
QUESTION_1 LAST FOUR WEEKS HOW MUCH OF THE TIME DID YOUR ASTHMA KEEP YOU FROM GETTING AS MUCH DONE AT WORK, SCHOOL OR AT HOME: A LITTLE OF THE TIME
QUESTION_5 LAST FOUR WEEKS HOW WOULD YOU RATE YOUR ASTHMA CONTROL: COMPLETELY CONTROLLED
QUESTION_4 LAST FOUR WEEKS HOW OFTEN HAVE YOU USED YOUR RESCUE INHALER OR NEBULIZER MEDICATION (SUCH AS ALBUTEROL): ONCE A WEEK OR LESS
QUESTION_3 LAST FOUR WEEKS HOW OFTEN DID YOUR ASTHMA SYMPTOMS (WHEEZING, COUGHING, SHORTNESS OF BREATH, CHEST TIGHTNESS OR PAIN) WAKE YOU UP AT NIGHT OR EARLIER THAN USUAL IN THE MORNING: NOT AT ALL
ACT_TOTALSCORE: 22

## 2024-08-05 NOTE — TELEPHONE ENCOUNTER
"Routing call to Dalila Gonsalez CNP-    Per last visit note- \"  I offered to increase metoprolol or switch to diltiazem, but he prefers to work on lifestyle modification first.  He is interested in further workup with a cardiac MRI as previously recommended at Dr. Mayo' appointment. \"    Pt calling in stating his palpitations are wrosening/increasing again and would like to try switching to Diltiazem. Rx can be sent to [facility].     Which dose of Diltiazem would you recommend.     Currently taking Toprol XL 50mg BID.     Lary Molina RN   "

## 2024-08-07 RX ORDER — DILTIAZEM HYDROCHLORIDE 240 MG/1
240 CAPSULE, EXTENDED RELEASE ORAL DAILY
Qty: 30 CAPSULE | Refills: 11 | Status: SHIPPED | OUTPATIENT
Start: 2024-08-07

## 2024-08-07 NOTE — TELEPHONE ENCOUNTER
MyChart msg sent to patient with providers comments and recommendations.    Rx sent to pharmacy.      Lary Molina RN

## 2024-08-07 NOTE — CONFIDENTIAL NOTE
Stop metoprolol.   Lets try diltiazem  mg daily and take this at least 1 hour prior to bedtime.     This is a slightly higher dose than what he was on for metoprolol, please let us know if blood pressures or heart rates are getting too low.   Call if blood pressure is less than 90 on top or less than 100 with lightheadedness.    Call if heart rate is less than 50 or less than 55 and lightheaded.       LUDIVINA Harris CNP

## 2024-08-08 ENCOUNTER — OFFICE VISIT (OUTPATIENT)
Dept: FAMILY MEDICINE | Facility: CLINIC | Age: 29
End: 2024-08-08
Payer: COMMERCIAL

## 2024-08-08 VITALS
OXYGEN SATURATION: 97 % | DIASTOLIC BLOOD PRESSURE: 84 MMHG | HEART RATE: 58 BPM | TEMPERATURE: 97.1 F | RESPIRATION RATE: 20 BRPM | SYSTOLIC BLOOD PRESSURE: 124 MMHG | BODY MASS INDEX: 26.36 KG/M2 | HEIGHT: 75 IN | WEIGHT: 212 LBS

## 2024-08-08 DIAGNOSIS — R79.89 DECREASED TESTOSTERONE LEVEL: Primary | ICD-10-CM

## 2024-08-08 LAB
ALBUMIN SERPL BCG-MCNC: 5 G/DL (ref 3.5–5.2)
ALP SERPL-CCNC: 80 U/L (ref 40–150)
ALT SERPL W P-5'-P-CCNC: 20 U/L (ref 0–70)
ANION GAP SERPL CALCULATED.3IONS-SCNC: 9 MMOL/L (ref 7–15)
AST SERPL W P-5'-P-CCNC: 21 U/L (ref 0–45)
BILIRUB SERPL-MCNC: 0.5 MG/DL
BUN SERPL-MCNC: 15 MG/DL (ref 6–20)
CALCIUM SERPL-MCNC: 9.9 MG/DL (ref 8.8–10.4)
CHLORIDE SERPL-SCNC: 101 MMOL/L (ref 98–107)
CREAT SERPL-MCNC: 1.05 MG/DL (ref 0.67–1.17)
EGFRCR SERPLBLD CKD-EPI 2021: >90 ML/MIN/1.73M2
ERYTHROCYTE [DISTWIDTH] IN BLOOD BY AUTOMATED COUNT: 12.5 % (ref 10–15)
FSH SERPL IRP2-ACNC: 4.4 MIU/ML (ref 1.5–12.4)
GLUCOSE SERPL-MCNC: 97 MG/DL (ref 70–99)
HCO3 SERPL-SCNC: 28 MMOL/L (ref 22–29)
HCT VFR BLD AUTO: 47.8 % (ref 40–53)
HGB BLD-MCNC: 15.8 G/DL (ref 13.3–17.7)
LH SERPL-ACNC: 4.4 MIU/ML (ref 1.7–8.6)
MCH RBC QN AUTO: 28.4 PG (ref 26.5–33)
MCHC RBC AUTO-ENTMCNC: 33.1 G/DL (ref 31.5–36.5)
MCV RBC AUTO: 86 FL (ref 78–100)
PLATELET # BLD AUTO: 245 10E3/UL (ref 150–450)
POTASSIUM SERPL-SCNC: 4.7 MMOL/L (ref 3.4–5.3)
PROT SERPL-MCNC: 8 G/DL (ref 6.4–8.3)
RBC # BLD AUTO: 5.57 10E6/UL (ref 4.4–5.9)
SODIUM SERPL-SCNC: 138 MMOL/L (ref 135–145)
WBC # BLD AUTO: 6.4 10E3/UL (ref 4–11)

## 2024-08-08 PROCEDURE — 90471 IMMUNIZATION ADMIN: CPT | Performed by: FAMILY MEDICINE

## 2024-08-08 PROCEDURE — 80053 COMPREHEN METABOLIC PANEL: CPT | Performed by: FAMILY MEDICINE

## 2024-08-08 PROCEDURE — 83002 ASSAY OF GONADOTROPIN (LH): CPT | Performed by: FAMILY MEDICINE

## 2024-08-08 PROCEDURE — 83001 ASSAY OF GONADOTROPIN (FSH): CPT | Performed by: FAMILY MEDICINE

## 2024-08-08 PROCEDURE — 36415 COLL VENOUS BLD VENIPUNCTURE: CPT | Performed by: FAMILY MEDICINE

## 2024-08-08 PROCEDURE — 90715 TDAP VACCINE 7 YRS/> IM: CPT | Performed by: FAMILY MEDICINE

## 2024-08-08 PROCEDURE — 99214 OFFICE O/P EST MOD 30 MIN: CPT | Mod: 25 | Performed by: FAMILY MEDICINE

## 2024-08-08 PROCEDURE — 85027 COMPLETE CBC AUTOMATED: CPT | Performed by: FAMILY MEDICINE

## 2024-08-08 RX ORDER — TESTOSTERONE CYPIONATE 1000 MG/10ML
100 INJECTION, SOLUTION INTRAMUSCULAR
Qty: 2 ML | Refills: 5 | Status: SHIPPED | OUTPATIENT
Start: 2024-08-08

## 2024-08-08 ASSESSMENT — PAIN SCALES - GENERAL: PAINLEVEL: NO PAIN (0)

## 2024-08-08 NOTE — PROGRESS NOTES
"S: Ovidio LEOBARDO Burns is a 28 year old male with low t estosterone.  Was hovering around 300, more recent one 200.  Tired, no libido, poor energy, low mood.  Has been researching, is very interested in trying replacement.     O:/84 (BP Location: Right arm, Patient Position: Sitting, Cuff Size: Adult Large)   Pulse 58   Temp 97.1  F (36.2  C) (Tympanic)   Resp 20   Ht 1.905 m (6' 3\")   Wt 96.2 kg (212 lb)   SpO2 97%   BMI 26.50 kg/m    GEN: Alert and oriented, in no acute distress  Affect is a little flat overall    A: low testosterone    P: start replacement.  100mg IM every 2 weeks, recheck either here or endo in a couple months.    Baseline cbc and lfts, I didn't see LH or FSH checked either.  Normal TSH.    He agrees with the plan    30  min spent on date of encounter reviewing chart, history, physical, documenting and counseling as mentioned in this note     "

## 2024-08-12 ENCOUNTER — TELEPHONE (OUTPATIENT)
Dept: FAMILY MEDICINE | Facility: CLINIC | Age: 29
End: 2024-08-12
Payer: COMMERCIAL

## 2024-08-12 DIAGNOSIS — R79.89 DECREASED TESTOSTERONE LEVEL: Primary | ICD-10-CM

## 2024-08-12 RX ORDER — NEEDLES, DISPOSABLE 25GX5/8"
NEEDLE, DISPOSABLE MISCELLANEOUS
Qty: 6 EACH | Refills: 1 | Status: SHIPPED | OUTPATIENT
Start: 2024-08-12

## 2024-08-12 RX ORDER — TESTOSTERONE CYPIONATE 200 MG/ML
100 INJECTION, SOLUTION INTRAMUSCULAR
Qty: 2 ML | Refills: 5 | Status: SHIPPED | OUTPATIENT
Start: 2024-08-12

## 2024-08-12 RX ORDER — NEEDLES, SAFETY 18GX1 1/2"
NEEDLE, DISPOSABLE MISCELLANEOUS
Qty: 6 EACH | Refills: 1 | Status: SHIPPED | OUTPATIENT
Start: 2024-08-12

## 2024-08-12 NOTE — TELEPHONE ENCOUNTER
General Call      Reason for Call:  mediation change request    What are your questions or concerns:  Pharmacy calling in regards to pt's testosterone that was sent 8/8 by Dr. Camara. The 100mg does not come in the 2ml dosage as originally prescribed. Wondering if this can be changed to 200mg per 1ml vile?    Okay to leave a detailed message?:  at Other phone number:  [phone]  [facility] - [place], [place] - [address] [place] [address]

## 2024-08-12 NOTE — TELEPHONE ENCOUNTER
Pharmacy calling. Received RX for the testosterone injection. Please send over RX for syringes and needles.       Preferred Pharmacy:   [facility] - [place], [place] - [address]   [address] [facility] [address]  Phone: [phone] Fax: [phone]

## 2024-08-28 ENCOUNTER — HOSPITAL ENCOUNTER (OUTPATIENT)
Dept: CARDIOLOGY | Facility: CLINIC | Age: 29
Discharge: HOME | End: 2024-08-28
Attending: NURSE PRACTITIONER | Admitting: NURSE PRACTITIONER
Payer: COMMERCIAL

## 2024-08-28 VITALS — DIASTOLIC BLOOD PRESSURE: 85 MMHG | SYSTOLIC BLOOD PRESSURE: 143 MMHG | HEART RATE: 70 BPM

## 2024-08-28 DIAGNOSIS — I49.3 PVC'S (PREMATURE VENTRICULAR CONTRACTIONS): ICD-10-CM

## 2024-08-28 PROCEDURE — 75561 CARDIAC MRI FOR MORPH W/DYE: CPT | Mod: 26 | Performed by: INTERNAL MEDICINE

## 2024-08-28 PROCEDURE — 255N000002 HC RX 255 OP 636: Performed by: NURSE PRACTITIONER

## 2024-08-28 PROCEDURE — 75561 CARDIAC MRI FOR MORPH W/DYE: CPT

## 2024-08-28 PROCEDURE — A9585 GADOBUTROL INJECTION: HCPCS | Performed by: NURSE PRACTITIONER

## 2024-08-28 RX ORDER — SODIUM CHLORIDE 9 MG/ML
INJECTION, SOLUTION INTRAVENOUS CONTINUOUS
Status: ACTIVE | OUTPATIENT
Start: 2024-08-28 | End: 2024-08-28

## 2024-08-28 RX ORDER — GADOBUTROL 604.72 MG/ML
13 INJECTION INTRAVENOUS ONCE
Status: COMPLETED | OUTPATIENT
Start: 2024-08-28 | End: 2024-08-28

## 2024-08-28 RX ORDER — DIAZEPAM 5 MG
5 TABLET ORAL EVERY 30 MIN PRN
Status: DISCONTINUED | OUTPATIENT
Start: 2024-08-28 | End: 2024-08-29 | Stop reason: HOSPADM

## 2024-08-28 RX ORDER — NITROGLYCERIN 0.4 MG/1
0.4 TABLET SUBLINGUAL EVERY 5 MIN PRN
Status: ACTIVE | OUTPATIENT
Start: 2024-08-28 | End: 2024-08-28

## 2024-08-28 RX ORDER — LORAZEPAM 0.5 MG/1
0.5 TABLET ORAL EVERY 10 MIN PRN
Status: DISCONTINUED | OUTPATIENT
Start: 2024-08-28 | End: 2024-08-29 | Stop reason: HOSPADM

## 2024-08-28 RX ORDER — LIDOCAINE 40 MG/G
CREAM TOPICAL
Status: DISCONTINUED | OUTPATIENT
Start: 2024-08-28 | End: 2024-08-29 | Stop reason: HOSPADM

## 2024-08-28 RX ADMIN — GADOBUTROL 13 ML: 604.72 INJECTION INTRAVENOUS at 13:42

## 2024-08-29 NOTE — RESULT ENCOUNTER NOTE
Normal biventricular function; no scar, fibrosis or infiltrative disease. Follow up with Dalila Gonsalez NP on 12/4/24. Pt notified via [employer].

## 2024-10-04 ENCOUNTER — TELEPHONE (OUTPATIENT)
Dept: FAMILY MEDICINE | Facility: CLINIC | Age: 29
End: 2024-10-04
Payer: COMMERCIAL

## 2024-10-04 NOTE — TELEPHONE ENCOUNTER
Pt asking for refill of testosterone.  Last Testosterone refill 8/12/24 for 2 ml & 5 refills.  Pt should have refills & was told to contact pharmacy.  Pt also having trouble threading the filter needle onto the syringe.  Pt was advised to bring needle/syringe into pharmacy to be checked.   Pt agrees.    Latesha Mcgee RN

## 2024-10-15 ENCOUNTER — TELEPHONE (OUTPATIENT)
Dept: FAMILY MEDICINE | Facility: CLINIC | Age: 29
End: 2024-10-15
Payer: COMMERCIAL

## 2024-10-15 DIAGNOSIS — I49.3 PVC'S (PREMATURE VENTRICULAR CONTRACTIONS): Primary | ICD-10-CM

## 2024-10-15 NOTE — TELEPHONE ENCOUNTER
Patient called to request a change in medication.  He wants to switch from diltiazem back to metoprolol.   He thinks the metoprolol worked best for him.  His BP has been around high 140s-low 150s/ 90s.     Pharmacy is [facility].    Laly Parsons RN on 10/15/2024 at 2:27 PM

## 2024-10-17 RX ORDER — METOPROLOL SUCCINATE 50 MG/1
50 TABLET, EXTENDED RELEASE ORAL DAILY
Qty: 90 TABLET | Refills: 3 | Status: SHIPPED | OUTPATIENT
Start: 2024-10-17

## 2024-10-21 ENCOUNTER — OFFICE VISIT (OUTPATIENT)
Dept: FAMILY MEDICINE | Facility: CLINIC | Age: 29
End: 2024-10-21
Payer: COMMERCIAL

## 2024-10-21 VITALS
OXYGEN SATURATION: 98 % | SYSTOLIC BLOOD PRESSURE: 128 MMHG | TEMPERATURE: 98.2 F | HEART RATE: 68 BPM | WEIGHT: 218 LBS | BODY MASS INDEX: 27.1 KG/M2 | HEIGHT: 75 IN | DIASTOLIC BLOOD PRESSURE: 86 MMHG | RESPIRATION RATE: 16 BRPM

## 2024-10-21 DIAGNOSIS — R79.89 DECREASED TESTOSTERONE LEVEL: Primary | ICD-10-CM

## 2024-10-21 DIAGNOSIS — I49.3 PVC'S (PREMATURE VENTRICULAR CONTRACTIONS): ICD-10-CM

## 2024-10-21 LAB
ALBUMIN SERPL BCG-MCNC: 4.7 G/DL (ref 3.5–5.2)
ALP SERPL-CCNC: 76 U/L (ref 40–150)
ALT SERPL W P-5'-P-CCNC: 21 U/L (ref 0–70)
AST SERPL W P-5'-P-CCNC: 19 U/L (ref 0–45)
BILIRUB DIRECT SERPL-MCNC: <0.2 MG/DL (ref 0–0.3)
BILIRUB SERPL-MCNC: 0.2 MG/DL
ERYTHROCYTE [DISTWIDTH] IN BLOOD BY AUTOMATED COUNT: 12.9 % (ref 10–15)
HCT VFR BLD AUTO: 44.4 % (ref 40–53)
HGB BLD-MCNC: 14.6 G/DL (ref 13.3–17.7)
MCH RBC QN AUTO: 28.5 PG (ref 26.5–33)
MCHC RBC AUTO-ENTMCNC: 32.9 G/DL (ref 31.5–36.5)
MCV RBC AUTO: 87 FL (ref 78–100)
PLATELET # BLD AUTO: 247 10E3/UL (ref 150–450)
PROT SERPL-MCNC: 7.6 G/DL (ref 6.4–8.3)
RBC # BLD AUTO: 5.12 10E6/UL (ref 4.4–5.9)
WBC # BLD AUTO: 7 10E3/UL (ref 4–11)

## 2024-10-21 PROCEDURE — 99214 OFFICE O/P EST MOD 30 MIN: CPT | Performed by: FAMILY MEDICINE

## 2024-10-21 PROCEDURE — G2211 COMPLEX E/M VISIT ADD ON: HCPCS | Performed by: FAMILY MEDICINE

## 2024-10-21 PROCEDURE — 84403 ASSAY OF TOTAL TESTOSTERONE: CPT | Performed by: FAMILY MEDICINE

## 2024-10-21 PROCEDURE — 85027 COMPLETE CBC AUTOMATED: CPT | Performed by: FAMILY MEDICINE

## 2024-10-21 PROCEDURE — 36415 COLL VENOUS BLD VENIPUNCTURE: CPT | Performed by: FAMILY MEDICINE

## 2024-10-21 PROCEDURE — 80076 HEPATIC FUNCTION PANEL: CPT | Performed by: FAMILY MEDICINE

## 2024-10-21 ASSESSMENT — PAIN SCALES - GENERAL: PAINLEVEL: NO PAIN (0)

## 2024-10-21 ASSESSMENT — PATIENT HEALTH QUESTIONNAIRE - PHQ9
SUM OF ALL RESPONSES TO PHQ QUESTIONS 1-9: 4
SUM OF ALL RESPONSES TO PHQ QUESTIONS 1-9: 4
10. IF YOU CHECKED OFF ANY PROBLEMS, HOW DIFFICULT HAVE THESE PROBLEMS MADE IT FOR YOU TO DO YOUR WORK, TAKE CARE OF THINGS AT HOME, OR GET ALONG WITH OTHER PEOPLE: SOMEWHAT DIFFICULT

## 2024-10-21 NOTE — PROGRESS NOTES
"S :Ovidio LEOBARDO Burns is a 28 year old male with low testosterone.  Other endo labs normal.  Wants to talk with endo about fertility, replacement, etc.  Has appt in a few months      Started replacement.  Working well. It's been 2 weeks since last shot, wants to see his trough level today.  Due for cbc and liver tests too    Pvcs: improved on 240mg dilt vs metoprolol, but had bad Ha with it.  Willing to try 120mg dose if metoprolol (which he's back on now instead of the dilt)    O:/86   Pulse 68   Temp 98.2  F (36.8  C) (Tympanic)   Resp 16   Ht 1.905 m (6' 3\")   Wt 98.9 kg (218 lb)   SpO2 98%   BMI 27.25 kg/m    GEN: Alert and oriented, in no acute distress  CV: RRR, no murmur      A:  low testosterone, improving on replacement          PVCs, improved some    P: continue shots.  Update labs.  Switched back to metoprolol.  Consider lower dose dilt if having more pvcs again.  HA could/should improve over time if he goes back on dilt, but would start with 120mg dose.           The longitudinal plan of care for the diagnosis(es)/condition(s) as documented were addressed during this visit. Due to the added complexity in care, I will continue to support Ovidio in the subsequent management and with ongoing continuity of care.    "

## 2024-10-24 ENCOUNTER — MYC MEDICAL ADVICE (OUTPATIENT)
Dept: FAMILY MEDICINE | Facility: CLINIC | Age: 29
End: 2024-10-24
Payer: COMMERCIAL

## 2024-10-24 DIAGNOSIS — R79.89 DECREASED TESTOSTERONE LEVEL: ICD-10-CM

## 2024-10-24 LAB — TESTOST SERPL-MCNC: 183 NG/DL (ref 240–950)

## 2024-10-28 RX ORDER — TESTOSTERONE CYPIONATE 1000 MG/10ML
100 INJECTION, SOLUTION INTRAMUSCULAR WEEKLY
Qty: 4 ML | Refills: 5 | Status: SHIPPED | OUTPATIENT
Start: 2024-10-28

## 2024-11-18 DIAGNOSIS — R79.89 DECREASED TESTOSTERONE LEVEL: ICD-10-CM

## 2024-11-18 RX ORDER — TESTOSTERONE CYPIONATE 1000 MG/10ML
100 INJECTION, SOLUTION INTRAMUSCULAR WEEKLY
Qty: 4 ML | Refills: 5 | Status: CANCELLED | OUTPATIENT
Start: 2024-11-18

## 2024-11-18 RX ORDER — TESTOSTERONE CYPIONATE 1000 MG/10ML
100 INJECTION, SOLUTION INTRAMUSCULAR WEEKLY
Qty: 4 ML | Refills: 5 | Status: SHIPPED | OUTPATIENT
Start: 2024-11-18 | End: 2024-11-19

## 2024-11-18 RX ORDER — NEEDLES, DISPOSABLE 25GX5/8"
NEEDLE, DISPOSABLE MISCELLANEOUS
Qty: 6 EACH | Refills: 1 | Status: CANCELLED | OUTPATIENT
Start: 2024-11-18

## 2024-11-18 RX ORDER — NEEDLES, DISPOSABLE 25GX5/8"
NEEDLE, DISPOSABLE MISCELLANEOUS
Qty: 12 EACH | Refills: 2 | Status: SHIPPED | OUTPATIENT
Start: 2024-11-18

## 2024-11-18 RX ORDER — NEEDLES, SAFETY 18GX1 1/2"
NEEDLE, DISPOSABLE MISCELLANEOUS
Qty: 12 EACH | Refills: 2 | Status: SHIPPED | OUTPATIENT
Start: 2024-11-18

## 2024-11-18 RX ORDER — NEEDLES, SAFETY 18GX1 1/2"
NEEDLE, DISPOSABLE MISCELLANEOUS
Qty: 6 EACH | Refills: 1 | Status: CANCELLED | OUTPATIENT
Start: 2024-11-18

## 2024-11-18 NOTE — TELEPHONE ENCOUNTER
Patient would rx to go to [facility] in [place]. (It was sent to [place] by mistake)     Patient is injection weekly, needs twice as much needles and syringes.    Pending Prescriptions:                       Disp   Refills    testosterone cypionate (DEPOTESTOSTERONE)*4 mL   5            Sig: Inject 1 mL (100 mg) into the muscle once a week.    Syringe/Needle (Disp) (BD ECLIPSE SYRINGE*6 each 1            Sig: Use with testosterone injection every 14 days    needle (disp) (BD HYPODERMIC NEEDLE) 18G *6 each 1            Sig: Use to draw up testosterone injection every 14           days      Patient is due for injection tomorrow.       Jaclyn HARRIS  Station

## 2024-11-19 NOTE — TELEPHONE ENCOUNTER
Per fax from pharmacy, 100 mg only comes in 10 ml vials, please send for 10 ml or consider 200 mg.     Panchito Ball, JACE Villarreal

## 2024-11-20 RX ORDER — TESTOSTERONE CYPIONATE 1000 MG/10ML
100 INJECTION, SOLUTION INTRAMUSCULAR WEEKLY
Qty: 10 ML | Refills: 5 | Status: SHIPPED | OUTPATIENT
Start: 2024-11-20

## 2024-12-04 ENCOUNTER — OFFICE VISIT (OUTPATIENT)
Dept: CARDIOLOGY | Facility: CLINIC | Age: 29
End: 2024-12-04
Payer: COMMERCIAL

## 2024-12-04 VITALS
OXYGEN SATURATION: 99 % | DIASTOLIC BLOOD PRESSURE: 84 MMHG | BODY MASS INDEX: 27.18 KG/M2 | HEART RATE: 60 BPM | HEIGHT: 75 IN | RESPIRATION RATE: 16 BRPM | WEIGHT: 218.6 LBS | SYSTOLIC BLOOD PRESSURE: 130 MMHG

## 2024-12-04 DIAGNOSIS — I10 BENIGN ESSENTIAL HYPERTENSION: ICD-10-CM

## 2024-12-04 DIAGNOSIS — I49.3 PVC'S (PREMATURE VENTRICULAR CONTRACTIONS): Primary | ICD-10-CM

## 2024-12-04 RX ORDER — METOPROLOL SUCCINATE 50 MG/1
50 TABLET, EXTENDED RELEASE ORAL 2 TIMES DAILY
COMMUNITY
Start: 2024-12-04

## 2024-12-04 NOTE — PROGRESS NOTES
Cardiology Clinic Progress Note  Ovidio Burns MRN# [id number]   YOB: 1995 Age: 28 year old      Primary Cardiologist:   Dr. Mayo for 5-month follow-up          History of Presenting Illness:      Patient was seen by me in July 2024.  Stress echo showing no ischemia or infarction and PVCs improved with exercise, EF was normal.  He felt palpitations had worsened over the prior year and I offered to increase metoprolol or switch to diltiazem.  At that time, he wanted to work on lifestyle modifications so I recommended discontinuing nicotine and decreasing alcohol and caffeine.  He was interested in further workup with cardiac MRI as previously recommended.  He noted life change with having his first child.  Patient later called wanting to switch to diltiazem.     At office visit with PCP in October 2024 patient had headache on diltiazem so switched back to metoprolol.  Could consider adding low-dose diltiazem in addition to metoprolol, if needed.    Most recent lipid profile, BMP, ALT reviewed today. Cardiac MRI 8/2024 showing normal biventricular size and function, no scar fibrosis or infiltrative disease, normal aortic root.  Results reviewed today.    BP? Controlled   Palpitations? Resolved   He started testosterone injections in August which may be contributing to the resolution of palpitations.   Nicotine? Still using nicotine pouches.  Recommended nicotine cessation.   Notes that he has been taking metoprolol XL 50 mg twice daily so updated chart to reflect this.     Patient reports no chest pain, shortness of breath, PND, orthopnea, presyncope, syncope, edema, heart racing, or palpitations.                    Assessment and Plan:     Plan  Patient Instructions   Medication Changes:  none     Recommendations:  Notify us if palpitations are worsening  Check blood pressure at least 1 hour after medications. Call the clinic if your blood pressure is consistently greater than 140/90.      Follow-up:  Cardiology follow up at [facility]: Dr. Mayo in 6 months.     Cardiology Scheduling~[phone]  Cardiology Clinic RN~[phone] (Ellen RN, Lary RN; Jacquelyn RN)          Problem List as of 12/4/2024 Reviewed: 12/9/2020  3:08 PM by Alfredito Stephenson MD            Noted       Active Problems    1. Benign essential hypertension 8/13/2019     Last Assessment & Plan 12/4/2024 Office Visit Written 12/4/2024 10:48 AM by Dalila Gonsalez APRN CNP      Controlled  Continue current medications         2. PVC's (premature ventricular contractions) - Primary 7/10/2024     Overview Addendum 12/4/2024 11:23 AM by Dalila Gonsalez APRN CNP      Felt as palpitations stronger beat and skipping sensation   Apple Watch showing PVCs correlating with symptoms  7-day Zio patch 3/2024 sinus rhythm, rare ectopy few symptoms correlated with single PVCs but patient had several symptoms during sinus rhythm.  Cardiac MRI 8/2024 normal EF no scar, fibrosis, infiltrative disease  Started testosterone injections which may have resulted in resolved palpitations          Last Assessment & Plan 12/4/2024 Office Visit Written 12/4/2024 11:23 AM by Dalila Gonsalez APRN CNP      Palpitations resolved  Continue metoprolol  Lifestyle modification          Relevant Medications     metoprolol succinate ER (TOPROL XL) 50 MG 24 hr tablet             Respiratory:  clear to auscultation; normal symmetry        Cardiac: regular rate and rhythm     GI:  abdomen nondistended     Extremities and Muscular Skeletal:   no edema                Thank you for allowing me to participate in this delightful patient's care.   This note was completed in part using Dragon voice recognition software. Although reviewed after completion, some word and grammatical errors may occur.    LUDIVINA Harris CNP

## 2024-12-04 NOTE — LETTER
12/4/2024    Allan Camara MD  [address] [place] [id number]    RE: Ovidio Burns       Dear Colleague,     I had the pleasure of seeing Ovidio Burns in the [facility].  Cardiology Clinic Progress Note  Ovidio Burns MRN# [id number]   YOB: 1995 Age: 28 year old      Primary Cardiologist:   Dr. Mayo for 5-month follow-up          History of Presenting Illness:      Patient was seen by me in July 2024.  Stress echo showing no ischemia or infarction and PVCs improved with exercise, EF was normal.  He felt palpitations had worsened over the prior year and I offered to increase metoprolol or switch to diltiazem.  At that time, he wanted to work on lifestyle modifications so I recommended discontinuing nicotine and decreasing alcohol and caffeine.  He was interested in further workup with cardiac MRI as previously recommended.  He noted life change with having his first child.  Patient later called wanting to switch to diltiazem.     At office visit with PCP in October 2024 patient had headache on diltiazem so switched back to metoprolol.  Could consider adding low-dose diltiazem in addition to metoprolol, if needed.    Most recent lipid profile, BMP, ALT reviewed today. Cardiac MRI 8/2024 showing normal biventricular size and function, no scar fibrosis or infiltrative disease, normal aortic root.  Results reviewed today.    BP? Controlled   Palpitations? Resolved   He started testosterone injections in August which may be contributing to the resolution of palpitations.   Nicotine? Still using nicotine pouches.  Recommended nicotine cessation.   Notes that he has been taking metoprolol XL 50 mg twice daily so updated chart to reflect this.     Patient reports no chest pain, shortness of breath, PND, orthopnea, presyncope, syncope, edema, heart racing, or palpitations.                    Assessment and Plan:     Plan  Patient Instructions   Medication  Changes:  none     Recommendations:  Notify us if palpitations are worsening  Check blood pressure at least 1 hour after medications. Call the clinic if your blood pressure is consistently greater than 140/90.     Follow-up:  Cardiology follow up at [facility]: Dr. Mayo in 6 months.     Cardiology Scheduling~[phone]  Cardiology Clinic RN~[phone] (Ellen RN, Lary RN; Jacquelyn RN)          Problem List as of 12/4/2024 Reviewed: 12/9/2020  3:08 PM by Alfredito Stephenson MD            Noted       Active Problems    1. Benign essential hypertension 8/13/2019     Last Assessment & Plan 12/4/2024 Office Visit Written 12/4/2024 10:48 AM by Dalila Gonsalez APRN CNP      Controlled  Continue current medications         2. PVC's (premature ventricular contractions) - Primary 7/10/2024     Overview Addendum 12/4/2024 11:23 AM by Dalila Gonsalez APRN CNP      Felt as palpitations stronger beat and skipping sensation   Apple Watch showing PVCs correlating with symptoms  7-day Zio patch 3/2024 sinus rhythm, rare ectopy few symptoms correlated with single PVCs but patient had several symptoms during sinus rhythm.  Cardiac MRI 8/2024 normal EF no scar, fibrosis, infiltrative disease  Started testosterone injections which may have resulted in resolved palpitations          Last Assessment & Plan 12/4/2024 Office Visit Written 12/4/2024 11:23 AM by Dalila Gonsalez APRN CNP      Palpitations resolved  Continue metoprolol  Lifestyle modification          Relevant Medications     metoprolol succinate ER (TOPROL XL) 50 MG 24 hr tablet             Respiratory:  clear to auscultation; normal symmetry        Cardiac: regular rate and rhythm     GI:  abdomen nondistended     Extremities and Muscular Skeletal:   no edema                Thank you for allowing me to participate in this delightful patient's care.   This note was completed in part using Dragon voice recognition software. Although  reviewed after completion, some word and grammatical errors may occur.    LUDIVINA Harris CNP                  Thank you for allowing me to participate in the care of your patient.      Sincerely,     LUDIVINA Harris CNP     [facility]  cc:   LUDIVINA Zarate CNP  [address] [place] [address]

## 2025-03-25 NOTE — PROGRESS NOTES
03/25/25 11:09 AM  PATIENT LAB/IMAGING STATUS : MyChart sent to patient to complete standing/future orders

## 2025-03-27 ENCOUNTER — LAB (OUTPATIENT)
Dept: LAB | Facility: CLINIC | Age: 30
End: 2025-03-27
Payer: COMMERCIAL

## 2025-03-27 DIAGNOSIS — R79.89 DECREASED TESTOSTERONE LEVEL: ICD-10-CM

## 2025-03-27 LAB
FSH SERPL IRP2-ACNC: 4.4 MIU/ML (ref 1.5–12.4)
LH SERPL-ACNC: 4.2 MIU/ML (ref 1.7–8.6)
SHBG SERPL-SCNC: 18 NMOL/L (ref 11–80)

## 2025-03-31 LAB
TESTOST FREE SERPL-MCNC: 8.7 NG/DL
TESTOST SERPL-MCNC: 324 NG/DL (ref 240–950)

## 2025-04-07 ENCOUNTER — OFFICE VISIT (OUTPATIENT)
Dept: ENDOCRINOLOGY | Facility: CLINIC | Age: 30
End: 2025-04-07
Payer: COMMERCIAL

## 2025-04-07 VITALS
OXYGEN SATURATION: 99 % | BODY MASS INDEX: 26 KG/M2 | DIASTOLIC BLOOD PRESSURE: 87 MMHG | HEART RATE: 88 BPM | WEIGHT: 208 LBS | SYSTOLIC BLOOD PRESSURE: 131 MMHG

## 2025-04-07 DIAGNOSIS — N52.9 ERECTILE DYSFUNCTION, UNSPECIFIED ERECTILE DYSFUNCTION TYPE: Primary | ICD-10-CM

## 2025-04-07 DIAGNOSIS — R68.82 LOW LIBIDO: ICD-10-CM

## 2025-04-07 PROCEDURE — 99214 OFFICE O/P EST MOD 30 MIN: CPT | Performed by: STUDENT IN AN ORGANIZED HEALTH CARE EDUCATION/TRAINING PROGRAM

## 2025-04-07 PROCEDURE — 1126F AMNT PAIN NOTED NONE PRSNT: CPT | Performed by: STUDENT IN AN ORGANIZED HEALTH CARE EDUCATION/TRAINING PROGRAM

## 2025-04-07 PROCEDURE — 3075F SYST BP GE 130 - 139MM HG: CPT | Performed by: STUDENT IN AN ORGANIZED HEALTH CARE EDUCATION/TRAINING PROGRAM

## 2025-04-07 PROCEDURE — 3079F DIAST BP 80-89 MM HG: CPT | Performed by: STUDENT IN AN ORGANIZED HEALTH CARE EDUCATION/TRAINING PROGRAM

## 2025-04-07 PROCEDURE — G2211 COMPLEX E/M VISIT ADD ON: HCPCS | Performed by: STUDENT IN AN ORGANIZED HEALTH CARE EDUCATION/TRAINING PROGRAM

## 2025-04-07 RX ORDER — TADALAFIL 5 MG/1
5 TABLET ORAL EVERY 24 HOURS
Qty: 90 TABLET | Refills: 3 | Status: SHIPPED | OUTPATIENT
Start: 2025-04-07

## 2025-04-07 ASSESSMENT — PAIN SCALES - GENERAL: PAINLEVEL_OUTOF10: NO PAIN (0)

## 2025-04-07 NOTE — NURSING NOTE
"Chief Complaint   Patient presents with    Testosterone problem     Vital signs:      BP: 131/87 Pulse: 88     SpO2: 99 %       Weight: 94.3 kg (208 lb)  Estimated body mass index is 26 kg/m  as calculated from the following:    Height as of 1/3/25: 1.905 m (6' 3\").    Weight as of this encounter: 94.3 kg (208 lb).      "

## 2025-04-07 NOTE — PROGRESS NOTES
Endocrinology Clinic Visit 4/7/2025    NAME:  Ovidio Burns  PCP:  Allan Camara  MRN:  [id number]  Reason for Consult: Follow-up for history of low testosterone  Requesting Provider:  No ref. provider found    Chief Complaint     Chief Complaint   Patient presents with    Testosterone problem       History of Present Illness     Ovidio Burns is a 29 year old male who is seen in clinic for follow-up for low testosterone.  He has background history of anxiety, HTN, PVCs and exercise-induced asthma.  Last visit was on 1/3/2025.    He had history of low libido had initial assessment in 2022:  3/8/2022: Total testosterone level 298 at 8:30 AM,  Repeat level on/10/2023 was 282 at 8 AM  On 10/10/2023: At 7 AM total testosterone level was 286, free testosterone level was at the lower range of normal 7.77, sex hormone binding globulin was normal 17.  TSH normal 1.48.  On 7/29/2024: Total testosterone level was 206   8/8/2024: FSH level was 4.4, LH level was 5.4,  Following that started on testosterone cypionate 100 mg every 14 days received it first time on 8/13/2024.  10/21/2024: At 12 PM total testosterone level was 183.  Hematocrit 44.4, hepatic function unremarkable.  Then the dose was increased to 100 mg weekly.  1/3/2025 visit: At that time was seeking fertility advised to stop testosterone and to get reassessment for persistence of hypogonadism.  3/27/2025: At 8:48 AM total testosterone normal 324, calculated free testosterone normal at 8.7, FSH 4.4, LH 4.2.  sex hormone binding globulin of the normal range 18.    He had normal pubertal history, has 1 biological son spontaneous pregnancy, no risk factors for developing secondary hypogonadism.No risk factors for developing primary hypogonadism.      On assessment today:  Last use of testosterone:was January 2024  Libido: loss of sexual desire since stopped testosterone   ED: stated getting erection with intercourse , able to sustain erection during  intercourse in most of the time until ejaculation     Sexual intercourse:  once every 10-14 days   Pregnancy of his partner: not yet she is getting assessment by OB and gyne this week later.   Supplements: started ashwagandha 2 weeks ago           Problem List     Patient Active Problem List   Diagnosis    Anxiety    Benign essential hypertension    CARDIOVASCULAR SCREENING; LDL GOAL LESS THAN 100    Low libido    Thoracic back pain    PVC's (premature ventricular contractions)    Exercise-induced asthma    Decreased testosterone level        Medications     Current Outpatient Medications   Medication Sig Dispense Refill    albuterol (PROAIR HFA/PROVENTIL HFA/VENTOLIN HFA) 108 (90 Base) MCG/ACT inhaler Inhale 2 puffs into the lungs every 6 hours as needed for shortness of breath, wheezing or cough 18 g 3    metoprolol succinate ER (TOPROL XL) 50 MG 24 hr tablet Take 1 tablet (50 mg) by mouth 2 times daily. 180 tablet 1    tadalafil (CIALIS) 5 MG tablet Take 1 tablet (5 mg) by mouth every 24 hours. 90 tablet 3     No current facility-administered medications for this visit.        Allergies     Allergies   Allergen Reactions    Lisinopril      Numb fingers       Medical / Surgical History     Past Medical History:   Diagnosis Date    Exercise-induced asthma 4/28/2009    Other abnormal heart sounds At Birth    Heart Murmur/neg. Echo     Past Surgical History:   Procedure Laterality Date    CIRCUMCISION,CLAMP  1994    HC TOOTH EXTRACTION W/FORCEP  2002       Social History     Social History     Socioeconomic History    Marital status:      Spouse name: Not on file    Number of children: 0    Years of education: 12th    Highest education level: Not on file   Occupational History     Employer: [employer]   Tobacco Use    Smoking status: Former     Types: Dip, chew, snus or snuff    Smokeless tobacco: Current     Types: Chew     Last attempt to quit: 3/8/2022    Tobacco comments:     Chews Zin-nicotine based    Vaping Use    Vaping status: Never Used   Substance and Sexual Activity    Alcohol use: Yes     Comment: 4 drinks per week    Drug use: No    Sexual activity: Yes     Partners: Female     Birth control/protection: None   Other Topics Concern    Parent/sibling w/ CABG, MI or angioplasty before 65F 55M? No   Social History Narrative    Not on file     Social Drivers of Health     Financial Resource Strain: Low Risk  (7/29/2024)    Financial Resource Strain     Within the past 12 months, have you or your family members you live with been unable to get utilities (heat, electricity) when it was really needed?: No   Food Insecurity: Low Risk  (7/29/2024)    Food Insecurity     Within the past 12 months, did you worry that your food would run out before you got money to buy more?: No     Within the past 12 months, did the food you bought just not last and you didn t have money to get more?: No   Transportation Needs: Low Risk  (7/29/2024)    Transportation Needs     Within the past 12 months, has lack of transportation kept you from medical appointments, getting your medicines, non-medical meetings or appointments, work, or from getting things that you need?: No   Physical Activity: Sufficiently Active (7/29/2024)    Exercise Vital Sign     Days of Exercise per Week: 4 days     Minutes of Exercise per Session: 40 min   Stress: Stress Concern Present (7/29/2024)    [employer] [employer] - Occupational Stress Questionnaire     Feeling of Stress : To some extent   Social Connections: Unknown (7/29/2024)    Social Connection and Isolation Panel [NHANES]     Frequency of Communication with Friends and Family: Not on file     Frequency of Social Gatherings with Friends and Family: Three times a week     Attends Muslim Services: Not on file     Active Member of Clubs or Organizations: Not on file     Attends Club or Organization Meetings: Not on file     Marital Status: Not on file   Interpersonal Safety:  "Low Risk  (7/29/2024)    Interpersonal Safety     Do you feel physically and emotionally safe where you currently live?: Yes     Within the past 12 months, have you been hit, slapped, kicked or otherwise physically hurt by someone?: No     Within the past 12 months, have you been humiliated or emotionally abused in other ways by your partner or ex-partner?: No   Housing Stability: Low Risk  (7/29/2024)    Housing Stability     Do you have housing? : Yes     Are you worried about losing your housing?: No       Family History     Family History   Problem Relation Age of Onset    Hypertension Maternal Grandmother     Breast Cancer Paternal Grandmother     Diabetes Paternal Grandfather     Hypertension Paternal Grandfather     Depression Mother     Hypertension Father     Cerebrovascular Disease Maternal Grandfather     Heart Disease Maternal Grandfather         MIs and CABG    Mental Illness Brother         anxiety       ROS     12 ROS completed, pertinent positive and negative in HPI    Physical Exam   /87   Pulse 88   Wt 94.3 kg (208 lb)   SpO2 99%   BMI 26.00 kg/m       General: Comfortable, no obvious distress, normal body habitus  HENT: Atraumatic,   CV: normal rate.   Resp:  good effort, no evidence of loud wheezing   Skin: No rashes, lesions, or subcutaneous nodules on exposed skin.   Psych: Alert and oriented x 3. Appropriate affect, good insight  Musculoskeletal: Appropriate muscle bulk   Neuro: Moves all four extremities. No focal deficits on limited exam.     Labs/Imaging     Pertinent Labs were reviewed and discussed briefly.  Radiology Results were  reviewed and discussed briefly.    Summary of recent findings:   No results found for: \"A1C\"    TSH   Date Value Ref Range Status   10/10/2023 1.48 0.30 - 4.20 uIU/mL Final   03/08/2022 1.74 0.40 - 4.00 mU/L Final   04/15/2020 1.77 0.40 - 4.00 mU/L Final       Creatinine   Date Value Ref Range Status   08/08/2024 1.05 0.67 - 1.17 mg/dL Final "   04/15/2020 1.31 (H) 0.66 - 1.25 mg/dL Final       Recent Labs   Lab Test 04/10/23  0808 08/05/19  1214   CHOL 159 141   HDL 43 52   LDL 91 79   TRIG 124 51      Latest Reference Range & Units 03/08/22 08:13 04/10/23 08:08 10/10/23 07:05 07/29/24 11:58 08/08/24 09:01 10/21/24 11:55 03/27/25 08:48   Free Testosterone Calculated ng/dL   7.77    8.70   FSH 1.5 - 12.4 mIU/mL     4.4  4.4   Luteinizing Hormone 1.7 - 8.6 mIU/mL     4.4  4.2   Testosterone Total 240 - 950 ng/dL 298 282 286 206 (L)  183 (L) 324   (L): Data is abnormally low  I personally reviewed the patient's outside records from [employer] EMR and Care Everywhere. Summary of pertinent findings in HPI.    Impression / Plan     1.  Loss of libido:  2.  Erectile dysfunction:  He was found previously to have borderline low total testosterone in 2022 and 2023.  Started on testosterone therapy in August 2024 however despite restarting testosterone therapy he was found to have lower total testosterone level in October 2024.  When we met first time in January 2025 him and his partner were trying to get the baby.  Taken off testosterone replacement therapy in January 2025.    Repeated tests on 3/27/2025 showed recovery of HPG axis total testosterone level was 324 within normal range and free testosterone calculated was 8.7 within normal range LH and FSH within normal range.    His wife is getting an assessment by OB/GYN later this week.    Was complaining today of loss of libido fatigue and ED following stopping using testosterone.    Plan:  -I discussed with him today about considering starting Cialis we went over the possible side effect that could be associated with his Cialis use he agreed to start on Cialis to improve erection and to need to increase frequency of intercourse to achieve pregnancy.  To start Cialis 5 mg daily  -Semen analysis.  -Follow-up in 3 months with repeat labs at that time and we will plan to repeat semen analysis at that time if the first  one is abnormal expected recovery usually is around 3-6 months if by 6 months low pregnancy and continues to have abnormal semen analysis we will consider starting clomiphene or hCG..      Test and/or medications prescribed today:  Orders Placed This Encounter   Procedures    Semen Analysis, Strict Morphology (FAISAL)    Luteinizing Hormone    Follicle stimulating hormone    Testosterone Free and Total         Follow up: 3 months with repeat lab test      MAJO Ovalles  Endocrinology, Diabetes and Metabolism  [facility]      33 minutes spent on the date of the encounter doing chart review, history and exam, documentation and further activities per the note  The longitudinal plan of care for the diagnosis(es)/condition(s) as documented were addressed during this visit. Due to the added complexity in care, I will continue to support Ovidio in the subsequent management and with ongoing continuity of care.    Note: Chart documentation done in part with Dragon Voice Recognition software. Although reviewed after completion, some word and grammatical errors may remain.  Please consider this when interpreting information in this chart

## 2025-04-07 NOTE — LETTER
4/7/2025       RE: Ovidio Burns  [id number] [facility] [id number]     Dear Colleague,    Thank you for referring your patient, Ovidio Burns, to the [facility] ENDOCRINOLOGY [facility]INIC [facility] at [facility]. Please see a copy of my visit note below.    03/25/25 11:09 AM  PATIENT LAB/IMAGING STATUS : MyChart sent to patient to complete standing/future orders       Endocrinology Clinic Visit 4/7/2025    NAME:  Ovidio Burns  PCP:  Allan Camara  MRN:  [id number]  Reason for Consult: Follow-up for history of low testosterone  Requesting Provider:  No ref. provider found    Chief Complaint     Chief Complaint   Patient presents with     Testosterone problem       History of Present Illness     Ovidio Burns is a 29 year old male who is seen in clinic for follow-up for low testosterone.  He has background history of anxiety, HTN, PVCs and exercise-induced asthma.  Last visit was on 1/3/2025.    He had history of low libido had initial assessment in 2022:  3/8/2022: Total testosterone level 298 at 8:30 AM,  Repeat level on/10/2023 was 282 at 8 AM  On 10/10/2023: At 7 AM total testosterone level was 286, free testosterone level was at the lower range of normal 7.77, sex hormone binding globulin was normal 17.  TSH normal 1.48.  On 7/29/2024: Total testosterone level was 206   8/8/2024: FSH level was 4.4, LH level was 5.4,  Following that started on testosterone cypionate 100 mg every 14 days received it first time on 8/13/2024.  10/21/2024: At 12 PM total testosterone level was 183.  Hematocrit 44.4, hepatic function unremarkable.  Then the dose was increased to 100 mg weekly.  1/3/2025 visit: At that time was seeking fertility advised to stop testosterone and to get reassessment for persistence of hypogonadism.  3/27/2025: At 8:48 AM total testosterone normal 324, calculated free testosterone normal at 8.7, FSH 4.4, LH 4.2.  sex hormone binding  globulin of the normal range 18.    He had normal pubertal history, has 1 biological son spontaneous pregnancy, no risk factors for developing secondary hypogonadism.No risk factors for developing primary hypogonadism.      On assessment today:  Last use of testosterone:was January 2024  Libido: loss of sexual desire since stopped testosterone   ED: stated getting erection with intercourse , able to sustain erection during intercourse in most of the time until ejaculation     Sexual intercourse:  once every 10-14 days   Pregnancy of his partner: not yet she is getting assessment by OB and gyne this week later.   Supplements: started ashwagandha 2 weeks ago           Problem List     Patient Active Problem List   Diagnosis     Anxiety     Benign essential hypertension     CARDIOVASCULAR SCREENING; LDL GOAL LESS THAN 100     Low libido     Thoracic back pain     PVC's (premature ventricular contractions)     Exercise-induced asthma     Decreased testosterone level        Medications     Current Outpatient Medications   Medication Sig Dispense Refill     albuterol (PROAIR HFA/PROVENTIL HFA/VENTOLIN HFA) 108 (90 Base) MCG/ACT inhaler Inhale 2 puffs into the lungs every 6 hours as needed for shortness of breath, wheezing or cough 18 g 3     metoprolol succinate ER (TOPROL XL) 50 MG 24 hr tablet Take 1 tablet (50 mg) by mouth 2 times daily. 180 tablet 1     tadalafil (CIALIS) 5 MG tablet Take 1 tablet (5 mg) by mouth every 24 hours. 90 tablet 3     No current facility-administered medications for this visit.        Allergies     Allergies   Allergen Reactions     Lisinopril      Numb fingers       Medical / Surgical History     Past Medical History:   Diagnosis Date     Exercise-induced asthma 4/28/2009     Other abnormal heart sounds At Birth    Heart Murmur/neg. Echo     Past Surgical History:   Procedure Laterality Date     CIRCUMCISION,CLAMP  1994     HC TOOTH EXTRACTION W/FORCEP  2002       Social History      Social History     Socioeconomic History     Marital status:      Spouse name: Not on file     Number of children: 0     Years of education: 12th     Highest education level: Not on file   Occupational History     Employer: CHILD   Tobacco Use     Smoking status: Former     Types: Dip, chew, snus or snuff     Smokeless tobacco: Current     Types: Chew     Last attempt to quit: 3/8/2022     Tobacco comments:     Chews Zin-nicotine based   Vaping Use     Vaping status: Never Used   Substance and Sexual Activity     Alcohol use: Yes     Comment: 4 drinks per week     Drug use: No     Sexual activity: Yes     Partners: Female     Birth control/protection: None   Other Topics Concern     Parent/sibling w/ CABG, MI or angioplasty before 65F 55M? No   Social History Narrative     Not on file     Social Drivers of Health     Financial Resource Strain: Low Risk  (7/29/2024)    Financial Resource Strain      Within the past 12 months, have you or your family members you live with been unable to get utilities (heat, electricity) when it was really needed?: No   Food Insecurity: Low Risk  (7/29/2024)    Food Insecurity      Within the past 12 months, did you worry that your food would run out before you got money to buy more?: No      Within the past 12 months, did the food you bought just not last and you didn t have money to get more?: No   Transportation Needs: Low Risk  (7/29/2024)    Transportation Needs      Within the past 12 months, has lack of transportation kept you from medical appointments, getting your medicines, non-medical meetings or appointments, work, or from getting things that you need?: No   Physical Activity: Sufficiently Active (7/29/2024)    Exercise Vital Sign      Days of Exercise per Week: 4 days      Minutes of Exercise per Session: 40 min   Stress: Stress Concern Present (7/29/2024)    [employer] [place] [employer] - Occupational Stress Questionnaire      Feeling of Stress : To  some extent   Social Connections: Unknown (7/29/2024)    Social Connection and Isolation Panel [NHANES]      Frequency of Communication with Friends and Family: Not on file      Frequency of Social Gatherings with Friends and Family: Three times a week      Attends Anabaptism Services: Not on file      Active Member of Clubs or Organizations: Not on file      Attends Club or Organization Meetings: Not on file      Marital Status: Not on file   Interpersonal Safety: Low Risk  (7/29/2024)    Interpersonal Safety      Do you feel physically and emotionally safe where you currently live?: Yes      Within the past 12 months, have you been hit, slapped, kicked or otherwise physically hurt by someone?: No      Within the past 12 months, have you been humiliated or emotionally abused in other ways by your partner or ex-partner?: No   Housing Stability: Low Risk  (7/29/2024)    Housing Stability      Do you have housing? : Yes      Are you worried about losing your housing?: No       Family History     Family History   Problem Relation Age of Onset     Hypertension Maternal Grandmother      Breast Cancer Paternal Grandmother      Diabetes Paternal Grandfather      Hypertension Paternal Grandfather      Depression Mother      Hypertension Father      Cerebrovascular Disease Maternal Grandfather      Heart Disease Maternal Grandfather         MIs and CABG     Mental Illness Brother         anxiety       ROS     12 ROS completed, pertinent positive and negative in HPI    Physical Exam   /87   Pulse 88   Wt 94.3 kg (208 lb)   SpO2 99%   BMI 26.00 kg/m       General: Comfortable, no obvious distress, normal body habitus  HENT: Atraumatic,   CV: normal rate.   Resp:  good effort, no evidence of loud wheezing   Skin: No rashes, lesions, or subcutaneous nodules on exposed skin.   Psych: Alert and oriented x 3. Appropriate affect, good insight  Musculoskeletal: Appropriate muscle bulk   Neuro: Moves all four extremities. No  "focal deficits on limited exam.     Labs/Imaging     Pertinent Labs were reviewed and discussed briefly.  Radiology Results were  reviewed and discussed briefly.    Summary of recent findings:   No results found for: \"A1C\"    TSH   Date Value Ref Range Status   10/10/2023 1.48 0.30 - 4.20 uIU/mL Final   03/08/2022 1.74 0.40 - 4.00 mU/L Final   04/15/2020 1.77 0.40 - 4.00 mU/L Final       Creatinine   Date Value Ref Range Status   08/08/2024 1.05 0.67 - 1.17 mg/dL Final   04/15/2020 1.31 (H) 0.66 - 1.25 mg/dL Final       Recent Labs   Lab Test 04/10/23  0808 08/05/19  1214   CHOL 159 141   HDL 43 52   LDL 91 79   TRIG 124 51      Latest Reference Range & Units 03/08/22 08:13 04/10/23 08:08 10/10/23 07:05 07/29/24 11:58 08/08/24 09:01 10/21/24 11:55 03/27/25 08:48   Free Testosterone Calculated ng/dL   7.77    8.70   FSH 1.5 - 12.4 mIU/mL     4.4  4.4   Luteinizing Hormone 1.7 - 8.6 mIU/mL     4.4  4.2   Testosterone Total 240 - 950 ng/dL 298 282 286 206 (L)  183 (L) 324   (L): Data is abnormally low  I personally reviewed the patient's outside records from [facility] EMR and Care Everywhere. Summary of pertinent findings in HPI.    Impression / Plan     1.  Loss of libido:  2.  Erectile dysfunction:  He was found previously to have borderline low total testosterone in 2022 and 2023.  Started on testosterone therapy in August 2024 however despite restarting testosterone therapy he was found to have lower total testosterone level in October 2024.  When we met first time in January 2025 him and his partner were trying to get the baby.  Taken off testosterone replacement therapy in January 2025.    Repeated tests on 3/27/2025 showed recovery of HPG axis total testosterone level was 324 within normal range and free testosterone calculated was 8.7 within normal range LH and FSH within normal range.    His wife is getting an assessment by OB/GYN later this week.    Was complaining today of loss of libido fatigue and ED following " stopping using testosterone.    Plan:  -I discussed with him today about considering starting Cialis we went over the possible side effect that could be associated with his Cialis use he agreed to start on Cialis to improve erection and to need to increase frequency of intercourse to achieve pregnancy.  To start Cialis 5 mg daily  -Semen analysis.  -Follow-up in 3 months with repeat labs at that time and we will plan to repeat semen analysis at that time if the first one is abnormal expected recovery usually is around 3-6 months if by 6 months low pregnancy and continues to have abnormal semen analysis we will consider starting clomiphene or hCG..      Test and/or medications prescribed today:  Orders Placed This Encounter   Procedures     Semen Analysis, Strict Morphology (FAISAL)     Luteinizing Hormone     Follicle stimulating hormone     Testosterone Free and Total         Follow up: 3 months with repeat lab test      MAJO Ovalles  Endocrinology, Diabetes and Metabolism  [facility]      33 minutes spent on the date of the encounter doing chart review, history and exam, documentation and further activities per the note  The longitudinal plan of care for the diagnosis(es)/condition(s) as documented were addressed during this visit. Due to the added complexity in care, I will continue to support Ovidio in the subsequent management and with ongoing continuity of care.    Note: Chart documentation done in part with Dragon Voice Recognition software. Although reviewed after completion, some word and grammatical errors may remain.  Please consider this when interpreting information in this chart        Again, thank you for allowing me to participate in the care of your patient.      Sincerely,    MAJO Ovalles

## 2025-04-07 NOTE — PATIENT INSTRUCTIONS
- To start using Cialis 5 mg daily to enhance erections frequency  - To get semen analysis after abstinence for 2-3 days  prior to having the semen analysis.   - To get repeat morning labs in 3 months fasting at 8 am   - To stop using ashwagandha

## 2025-05-22 ENCOUNTER — LAB (OUTPATIENT)
Dept: LAB | Facility: CLINIC | Age: 30
End: 2025-05-22
Payer: COMMERCIAL

## 2025-05-22 DIAGNOSIS — R68.82 LOW LIBIDO: ICD-10-CM

## 2025-05-22 PROCEDURE — 89322 SEMEN ANAL STRICT CRITERIA: CPT

## 2025-05-27 LAB
ABNORMAL SPERM MORPHOLOGY: 92
ABSTINENCE DAYS: 4 DAYS (ref 2–7)
AGGLUTINATION: NO
ANALYSIS TEMP - CENTIGRADE: 22 CENTIGRADE
COLLECTION METHOD: NORMAL
COLLECTION SITE: NORMAL
CONSENT TO RELEASE TO PARTNER: NO
DAL- RECEIVED TIME: NORMAL
HEAD DEFECT: 90 %
IMMOTILE: 43 %
LIQUEFIED: YES
MIDPIECE DEFECT: 23 %
NON-PROGRESSIVE MOTILITY: 2 %
NORMAL SPERM MORPHOLOGY: 8 % NORMAL FORMS
PROGRESSIVE MOTILITY: 55 %
ROUND CELLS: <0.1 MILLION/ML
SPECIMEN PH: 7.6 PH
SPECIMEN VOLUME: 5 ML
SPERM CONCENTRATION: 27.1 MILLION/ML
TAIL DEFECT: 3 %
TIME OF ANALYSIS: NORMAL
TOTAL PROGRESSIVE MOTILE NUMBER: 75 MILLION
TOTAL SPERM NUMBER: 136 MILLION
VISCOUS: NO
VITALITY: NORMAL

## 2025-06-23 NOTE — PROGRESS NOTES
06/23/25 12:41 PM  PATIENT LAB/IMAGING STATUS : MyChart sent to patient to complete standing/future orders

## 2025-07-01 ENCOUNTER — PATIENT OUTREACH (OUTPATIENT)
Dept: CARE COORDINATION | Facility: CLINIC | Age: 30
End: 2025-07-01
Payer: COMMERCIAL

## 2025-07-08 ENCOUNTER — LAB (OUTPATIENT)
Dept: LAB | Facility: CLINIC | Age: 30
End: 2025-07-08
Payer: COMMERCIAL

## 2025-07-08 DIAGNOSIS — R68.82 LOW LIBIDO: ICD-10-CM

## 2025-07-08 LAB
FSH SERPL IRP2-ACNC: 5.4 MIU/ML (ref 1.5–12.4)
LH SERPL-ACNC: 5.9 MIU/ML (ref 1.7–8.6)
SHBG SERPL-SCNC: 18 NMOL/L (ref 11–80)

## 2025-07-08 PROCEDURE — 36415 COLL VENOUS BLD VENIPUNCTURE: CPT

## 2025-07-08 PROCEDURE — 83002 ASSAY OF GONADOTROPIN (LH): CPT

## 2025-07-08 PROCEDURE — 84270 ASSAY OF SEX HORMONE GLOBUL: CPT

## 2025-07-08 PROCEDURE — 83001 ASSAY OF GONADOTROPIN (FSH): CPT

## 2025-07-08 PROCEDURE — 84403 ASSAY OF TOTAL TESTOSTERONE: CPT

## 2025-07-09 LAB
TESTOST FREE SERPL-MCNC: 7.48 NG/DL
TESTOST SERPL-MCNC: 282 NG/DL (ref 240–950)

## 2025-07-21 ENCOUNTER — OFFICE VISIT (OUTPATIENT)
Dept: ENDOCRINOLOGY | Facility: CLINIC | Age: 30
End: 2025-07-21
Payer: COMMERCIAL

## 2025-07-21 VITALS
HEIGHT: 75 IN | BODY MASS INDEX: 25.99 KG/M2 | HEART RATE: 90 BPM | WEIGHT: 209 LBS | SYSTOLIC BLOOD PRESSURE: 147 MMHG | OXYGEN SATURATION: 98 % | DIASTOLIC BLOOD PRESSURE: 97 MMHG

## 2025-07-21 DIAGNOSIS — R79.89 DECREASED TESTOSTERONE LEVEL: ICD-10-CM

## 2025-07-21 DIAGNOSIS — E23.0 CENTRAL HYPOGONADISM: Primary | ICD-10-CM

## 2025-07-21 PROCEDURE — 3077F SYST BP >= 140 MM HG: CPT | Performed by: STUDENT IN AN ORGANIZED HEALTH CARE EDUCATION/TRAINING PROGRAM

## 2025-07-21 PROCEDURE — 1126F AMNT PAIN NOTED NONE PRSNT: CPT | Performed by: STUDENT IN AN ORGANIZED HEALTH CARE EDUCATION/TRAINING PROGRAM

## 2025-07-21 PROCEDURE — G2211 COMPLEX E/M VISIT ADD ON: HCPCS | Performed by: STUDENT IN AN ORGANIZED HEALTH CARE EDUCATION/TRAINING PROGRAM

## 2025-07-21 PROCEDURE — 99214 OFFICE O/P EST MOD 30 MIN: CPT | Performed by: STUDENT IN AN ORGANIZED HEALTH CARE EDUCATION/TRAINING PROGRAM

## 2025-07-21 PROCEDURE — 3080F DIAST BP >= 90 MM HG: CPT | Performed by: STUDENT IN AN ORGANIZED HEALTH CARE EDUCATION/TRAINING PROGRAM

## 2025-07-21 RX ORDER — CLOMIPHENE CITRATE 50 MG/1
25 TABLET ORAL DAILY
Qty: 30 TABLET | Refills: 3 | Status: SHIPPED | OUTPATIENT
Start: 2025-07-21 | End: 2025-07-21

## 2025-07-21 RX ORDER — CLOMIPHENE CITRATE 50 MG/1
25 TABLET ORAL DAILY
Qty: 30 TABLET | Refills: 3 | Status: SHIPPED | OUTPATIENT
Start: 2025-07-21

## 2025-07-21 ASSESSMENT — PAIN SCALES - GENERAL: PAINLEVEL_OUTOF10: NO PAIN (0)

## 2025-07-21 NOTE — PROGRESS NOTES
Endocrinology Clinic Visit 7/21/2025    NAME:  Ovidio Burns  PCP:  Allan Camara  MRN:  [id number]  Reason for Consult: Follow-up for infertility  Requesting Provider:  Referred Self    Chief Complaint     Chief Complaint   Patient presents with    RECHECK       History of Present Illness     Ovidio Burns is a 29 year old male who is seen in clinic for follow-up for infertility.  Last visit was on 4/7/2025.    He has background history of anxiety, HTN, PVCs and exercise-induced asthma.     History of infertility:  He had history of low libido had initial assessment in 2022:  3/8/2022: Total testosterone level 298 at 8:30 AM,  Repeat level on/10/2023 was 282 at 8 AM  On 10/10/2023: At 7 AM total testosterone level was 286, free testosterone level was at the lower range of normal 7.77, sex hormone binding globulin was normal 17.  TSH normal 1.48.  On 7/29/2024: Total testosterone level was 206   8/8/2024: FSH level was 4.4, LH level was 5.4,  Following that started on testosterone cypionate 100 mg every 14 days received it first time on 8/13/2024.  10/21/2024: At 12 PM total testosterone level was 183.  Hematocrit 44.4, hepatic function unremarkable.  Then the dose was increased to 100 mg weekly.  1/3/2025 visit: It was his initial visit, at that time was seeking fertility advised to stop testosterone and to get reassessment for persistence of hypogonadism.  Testosterone was discontinued.  3/27/2025: At 8:48 AM total testosterone normal 324, calculated free testosterone normal at 8.7, FSH 4.4, LH 4.2.  sex hormone binding globulin of the normal range 18.  4/7/2025 visit: Was found to be off testosterone since January 2025, no pregnancy at that time, was complaining of loss of libido and ED started on Cialis 5 mg daily.    5/22/2025: semen analysis: Was normal, pH 7.6, volume 5 mL, sperm concentration 27 mg/mL, total sperm #136 million, progressive motility 55%, normal sperm morphology 8%.    7/8/2025: 9:43  AM: Total testosterone 282, free testosterone calculated to 7.48, LH 5.9, FSH 5.4.     He had normal pubertal history, has 1 biological son spontaneous pregnancy, no risk factors for developing secondary hypogonadism.No risk factors for developing primary hypogonadism.    On assessment today:  Libido: terrible   Erections: used Cialis for some time then quite using it , stated today the issue is not having desire but when he gets desire he is able to get erections and mentain it during the intercourse   Frequency of sexual intercourse:  Pregnancy of his partner : No pregnancy yet , she was diagnosed with endometriosis and planned to get the surgery in 08/2025         Problem List     Patient Active Problem List   Diagnosis    Anxiety    Benign essential hypertension    CARDIOVASCULAR SCREENING; LDL GOAL LESS THAN 100    Low libido    Thoracic back pain    PVC's (premature ventricular contractions)    Exercise-induced asthma    Decreased testosterone level        Medications     Current Outpatient Medications   Medication Sig Dispense Refill    albuterol (PROAIR HFA/PROVENTIL HFA/VENTOLIN HFA) 108 (90 Base) MCG/ACT inhaler Inhale 2 puffs into the lungs every 6 hours as needed for shortness of breath, wheezing or cough 18 g 3    clomiPHENE (CLOMID) 50 MG tablet Take 0.5 tablets (25 mg) by mouth daily. 30 tablet 3    metoprolol succinate ER (TOPROL XL) 50 MG 24 hr tablet Take 1 tablet (50 mg) by mouth 2 times daily. 180 tablet 1     No current facility-administered medications for this visit.        Allergies     Allergies   Allergen Reactions    Lisinopril      Numb fingers       Medical / Surgical History     Past Medical History:   Diagnosis Date    Exercise-induced asthma 4/28/2009    Other abnormal heart sounds At Birth    Heart Murmur/neg. Echo     Past Surgical History:   Procedure Laterality Date    CIRCUMCISION,CLAMP  1994    HC TOOTH EXTRACTION W/FORCEP  2002       Social History     Social History      Socioeconomic History    Marital status:      Spouse name: Not on file    Number of children: 0    Years of education: 12th    Highest education level: Not on file   Occupational History     Employer: CHILD   Tobacco Use    Smoking status: Former     Types: Dip, chew, snus or snuff    Smokeless tobacco: Current     Types: Chew     Last attempt to quit: 3/8/2022    Tobacco comments:     Chews Zin-nicotine based   Vaping Use    Vaping status: Never Used   Substance and Sexual Activity    Alcohol use: Yes     Comment: 4 drinks per week    Drug use: No    Sexual activity: Yes     Partners: Female     Birth control/protection: None   Other Topics Concern    Parent/sibling w/ CABG, MI or angioplasty before 65F 55M? No   Social History Narrative    Not on file     Social Drivers of Health     Financial Resource Strain: Low Risk  (7/29/2024)    Financial Resource Strain     Within the past 12 months, have you or your family members you live with been unable to get utilities (heat, electricity) when it was really needed?: No   Food Insecurity: Low Risk  (7/29/2024)    Food Insecurity     Within the past 12 months, did you worry that your food would run out before you got money to buy more?: No     Within the past 12 months, did the food you bought just not last and you didn t have money to get more?: No   Transportation Needs: Low Risk  (7/29/2024)    Transportation Needs     Within the past 12 months, has lack of transportation kept you from medical appointments, getting your medicines, non-medical meetings or appointments, work, or from getting things that you need?: No   Physical Activity: Sufficiently Active (7/29/2024)    Exercise Vital Sign     Days of Exercise per Week: 4 days     Minutes of Exercise per Session: 40 min   Stress: Stress Concern Present (7/29/2024)    [employer] [place] [employer] - Occupational Stress Questionnaire     Feeling of Stress : To some extent   Social Connections:  "Unknown (7/29/2024)    Social Connection and Isolation Panel [NHANES]     Frequency of Communication with Friends and Family: Not on file     Frequency of Social Gatherings with Friends and Family: Three times a week     Attends Cheondoism Services: Not on file     Active Member of Clubs or Organizations: Not on file     Attends Club or Organization Meetings: Not on file     Marital Status: Not on file   Interpersonal Safety: Low Risk  (7/29/2024)    Interpersonal Safety     Do you feel physically and emotionally safe where you currently live?: Yes     Within the past 12 months, have you been hit, slapped, kicked or otherwise physically hurt by someone?: No     Within the past 12 months, have you been humiliated or emotionally abused in other ways by your partner or ex-partner?: No   Housing Stability: Low Risk  (7/29/2024)    Housing Stability     Do you have housing? : Yes     Are you worried about losing your housing?: No       Family History     Family History   Problem Relation Age of Onset    Hypertension Maternal Grandmother     Breast Cancer Paternal Grandmother     Diabetes Paternal Grandfather     Hypertension Paternal Grandfather     Depression Mother     Hypertension Father     Cerebrovascular Disease Maternal Grandfather     Heart Disease Maternal Grandfather         MIs and CABG    Mental Illness Brother         anxiety       ROS     12 ROS completed, pertinent positive and negative in HPI    Physical Exam   BP (!) 147/97   Pulse 90   Ht 1.905 m (6' 3\")   Wt 94.8 kg (209 lb)   SpO2 98%   BMI 26.12 kg/m       General: Comfortable, no obvious distress, normal body habitus  HENT: Atraumatic,   CV: normal rate.   Resp:  good effort, no evidence of loud wheezing   Skin: No rashes, lesions, or subcutaneous nodules on exposed skin.   Psych: Alert and oriented x 3. Appropriate affect, good insight  Musculoskeletal: Appropriate muscle bulk   Neuro: Moves all four extremities. No focal deficits on limited " "exam.     Labs/Imaging     Pertinent Labs were reviewed and discussed briefly.  Radiology Results were  reviewed.    Summary of recent findings:   No results found for: \"A1C\"    TSH   Date Value Ref Range Status   10/10/2023 1.48 0.30 - 4.20 uIU/mL Final   03/08/2022 1.74 0.40 - 4.00 mU/L Final   04/15/2020 1.77 0.40 - 4.00 mU/L Final       Creatinine   Date Value Ref Range Status   08/08/2024 1.05 0.67 - 1.17 mg/dL Final   04/15/2020 1.31 (H) 0.66 - 1.25 mg/dL Final        Latest Reference Range & Units 03/08/22 08:13 04/10/23 08:08 10/10/23 07:05 07/29/24 11:58 08/08/24 09:01 10/21/24 11:55 03/27/25 08:48 07/08/25 09:43   Free Testosterone Calculated ng/dL   7.77    8.70 7.48   FSH 1.5 - 12.4 mIU/mL     4.4  4.4 5.4   Luteinizing Hormone 1.7 - 8.6 mIU/mL     4.4  4.2 5.9   Testosterone Total 240 - 950 ng/dL 298 282 286 206 (L)  183 (L) 324 282   (L): Data is abnormally low        I personally reviewed the patient's outside records from [facility] EMR and Care Everywhere. Summary of pertinent findings in HPI.    Impression / Plan     1.  Central hypothyroidism seeking fertility:  History of borderline low morning testosterone in March 2022 around October 2023.  Test was repeated in July 2024 with similar results.  FSH/LH not elevated.  Following that started by outside provider on testosterone cypionate injections and continued on it.  He established care with me in January 2025 was seeking fertility at that time. Testosterone was discontinued.  Repeated labs on March 2025 total testosterone in the morning was 324, on 7/80/2025 was 282.  Has ongoing loss of libido since stopped using testosterone.    Semen Analysis was done in May 2025 came back normal.      Impression: Central hypogonadism given low/borderline low testosterone for age with ongoing symptoms.  Seeking fertility his wife is going to get gynecological surgery done for her soon.    Given ongoing symptoms we discussed today about considering starting use of " clomiphene off label for central hypogonadism, he agreed with that.    Plan:  -To get MRI pituitary done.  -To get prolactin/TFTs done.  -To get ferritin level checked.  -To start Clomid 25 mg daily.  -To get testosterone/hematocrit checked after 3 months from starting Clomid.      Test and/or medications prescribed today:  Orders Placed This Encounter   Procedures    MR Pituitary w/o & w Contrast    Hematocrit    Testosterone Free and Total         Follow up: 3 months with labs       MAJO Ovalles  Endocrinology, Diabetes and Metabolism  [facility]      35  minutes spent on the date of the encounter doing chart review, history and exam, documentation and further activities per the note  The longitudinal plan of care for the diagnosis(es)/condition(s) as documented were addressed during this visit. Due to the added complexity in care, I will continue to support Ovidio in the subsequent management and with ongoing continuity of care.    Note: Chart documentation done in part with Dragon Voice Recognition software. Although reviewed after completion, some word and grammatical errors may remain.  Please consider this when interpreting information in this chart

## 2025-07-21 NOTE — PATIENT INSTRUCTIONS
- To start using Clomid 25 mg daily   - To get the lab test in the morning at 8:00 am few days before the next test   - To get MRI done   - I will see you back in 3 months

## 2025-07-21 NOTE — LETTER
7/21/2025       RE: Ovidio Burns  [id number] [facility] [address]     Dear Colleague,    Thank you for referring your patient, Ovidio Burns, to the [facility] ENDOCR[facility]OLOG[facility] [place] at [facility]. Please see a copy of my visit note below.    06/23/25 12:41 PM  PATIENT LAB/IMAGING STATUS : MyChart sent to patient to complete standing/future orders      Endocrinology Clinic Visit 7/21/2025    NAME:  Ovidio Burns  PCP:  Allan Camara  MRN:  [id number]  Reason for Consult: Follow-up for infertility  Requesting Provider:  Referred Self    Chief Complaint     Chief Complaint   Patient presents with     RECHECK       History of Present Illness     Ovidio Burns is a 29 year old male who is seen in clinic for follow-up for infertility.  Last visit was on 4/7/2025.    He has background history of anxiety, HTN, PVCs and exercise-induced asthma.     History of infertility:  He had history of low libido had initial assessment in 2022:  3/8/2022: Total testosterone level 298 at 8:30 AM,  Repeat level on/10/2023 was 282 at 8 AM  On 10/10/2023: At 7 AM total testosterone level was 286, free testosterone level was at the lower range of normal 7.77, sex hormone binding globulin was normal 17.  TSH normal 1.48.  On 7/29/2024: Total testosterone level was 206   8/8/2024: FSH level was 4.4, LH level was 5.4,  Following that started on testosterone cypionate 100 mg every 14 days received it first time on 8/13/2024.  10/21/2024: At 12 PM total testosterone level was 183.  Hematocrit 44.4, hepatic function unremarkable.  Then the dose was increased to 100 mg weekly.  1/3/2025 visit: It was his initial visit, at that time was seeking fertility advised to stop testosterone and to get reassessment for persistence of hypogonadism.  Testosterone was discontinued.  3/27/2025: At 8:48 AM total testosterone normal 324, calculated free testosterone normal at  8.7, FSH 4.4, LH 4.2.  sex hormone binding globulin of the normal range 18.  4/7/2025 visit: Was found to be off testosterone since January 2025, no pregnancy at that time, was complaining of loss of libido and ED started on Cialis 5 mg daily.    5/22/2025: semen analysis: Was normal, pH 7.6, volume 5 mL, sperm concentration 27 mg/mL, total sperm #136 million, progressive motility 55%, normal sperm morphology 8%.    7/8/2025: 9:43 AM: Total testosterone 282, free testosterone calculated to 7.48, LH 5.9, FSH 5.4.     He had normal pubertal history, has 1 biological son spontaneous pregnancy, no risk factors for developing secondary hypogonadism.No risk factors for developing primary hypogonadism.    On assessment today:  Libido: terrible   Erections: used Cialis for some time then quite using it , stated today the issue is not having desire but when he gets desire he is able to get erections and mentain it during the intercourse   Frequency of sexual intercourse:  Pregnancy of his partner : No pregnancy yet , she was diagnosed with endometriosis and planned to get the surgery in 08/2025         Problem List     Patient Active Problem List   Diagnosis     Anxiety     Benign essential hypertension     CARDIOVASCULAR SCREENING; LDL GOAL LESS THAN 100     Low libido     Thoracic back pain     PVC's (premature ventricular contractions)     Exercise-induced asthma     Decreased testosterone level        Medications     Current Outpatient Medications   Medication Sig Dispense Refill     albuterol (PROAIR HFA/PROVENTIL HFA/VENTOLIN HFA) 108 (90 Base) MCG/ACT inhaler Inhale 2 puffs into the lungs every 6 hours as needed for shortness of breath, wheezing or cough 18 g 3     clomiPHENE (CLOMID) 50 MG tablet Take 0.5 tablets (25 mg) by mouth daily. 30 tablet 3     metoprolol succinate ER (TOPROL XL) 50 MG 24 hr tablet Take 1 tablet (50 mg) by mouth 2 times daily. 180 tablet 1     No current facility-administered medications for  this visit.        Allergies     Allergies   Allergen Reactions     Lisinopril      Numb fingers       Medical / Surgical History     Past Medical History:   Diagnosis Date     Exercise-induced asthma 4/28/2009     Other abnormal heart sounds At Birth    Heart Murmur/neg. Echo     Past Surgical History:   Procedure Laterality Date     CIRCUMCISION,CLAMP  1994     HC TOOTH EXTRACTION W/FORCEP  2002       Social History     Social History     Socioeconomic History     Marital status:      Spouse name: Not on file     Number of children: 0     Years of education: 12th     Highest education level: Not on file   Occupational History     Employer: CHILD   Tobacco Use     Smoking status: Former     Types: Dip, chew, snus or snuff     Smokeless tobacco: Current     Types: Chew     Last attempt to quit: 3/8/2022     Tobacco comments:     Chews Zin-nicotine based   Vaping Use     Vaping status: Never Used   Substance and Sexual Activity     Alcohol use: Yes     Comment: 4 drinks per week     Drug use: No     Sexual activity: Yes     Partners: Female     Birth control/protection: None   Other Topics Concern     Parent/sibling w/ CABG, MI or angioplasty before 65F 55M? No   Social History Narrative     Not on file     Social Drivers of Health     Financial Resource Strain: Low Risk  (7/29/2024)    Financial Resource Strain      Within the past 12 months, have you or your family members you live with been unable to get utilities (heat, electricity) when it was really needed?: No   Food Insecurity: Low Risk  (7/29/2024)    Food Insecurity      Within the past 12 months, did you worry that your food would run out before you got money to buy more?: No      Within the past 12 months, did the food you bought just not last and you didn t have money to get more?: No   Transportation Needs: Low Risk  (7/29/2024)    Transportation Needs      Within the past 12 months, has lack of transportation kept you from medical  appointments, getting your medicines, non-medical meetings or appointments, work, or from getting things that you need?: No   Physical Activity: Sufficiently Active (7/29/2024)    Exercise Vital Sign      Days of Exercise per Week: 4 days      Minutes of Exercise per Session: 40 min   Stress: Stress Concern Present (7/29/2024)    [employer] - Occupational Stress Questionnaire      Feeling of Stress : To some extent   Social Connections: Unknown (7/29/2024)    Social Connection and Isolation Panel [NHANES]      Frequency of Communication with Friends and Family: Not on file      Frequency of Social Gatherings with Friends and Family: Three times a week      Attends Islam Services: Not on file      Active Member of Clubs or Organizations: Not on file      Attends Club or Organization Meetings: Not on file      Marital Status: Not on file   Interpersonal Safety: Low Risk  (7/29/2024)    Interpersonal Safety      Do you feel physically and emotionally safe where you currently live?: Yes      Within the past 12 months, have you been hit, slapped, kicked or otherwise physically hurt by someone?: No      Within the past 12 months, have you been humiliated or emotionally abused in other ways by your partner or ex-partner?: No   Housing Stability: Low Risk  (7/29/2024)    Housing Stability      Do you have housing? : Yes      Are you worried about losing your housing?: No       Family History     Family History   Problem Relation Age of Onset     Hypertension Maternal Grandmother      Breast Cancer Paternal Grandmother      Diabetes Paternal Grandfather      Hypertension Paternal Grandfather      Depression Mother      Hypertension Father      Cerebrovascular Disease Maternal Grandfather      Heart Disease Maternal Grandfather         MIs and CABG     Mental Illness Brother         anxiety       ROS     12 ROS completed, pertinent positive and negative in HPI    Physical Exam   BP (!) 147/97  "  Pulse 90   Ht 1.905 m (6' 3\")   Wt 94.8 kg (209 lb)   SpO2 98%   BMI 26.12 kg/m       General: Comfortable, no obvious distress, normal body habitus  HENT: Atraumatic,   CV: normal rate.   Resp:  good effort, no evidence of loud wheezing   Skin: No rashes, lesions, or subcutaneous nodules on exposed skin.   Psych: Alert and oriented x 3. Appropriate affect, good insight  Musculoskeletal: Appropriate muscle bulk   Neuro: Moves all four extremities. No focal deficits on limited exam.     Labs/Imaging     Pertinent Labs were reviewed and discussed briefly.  Radiology Results were  reviewed.    Summary of recent findings:   No results found for: \"A1C\"    TSH   Date Value Ref Range Status   10/10/2023 1.48 0.30 - 4.20 uIU/mL Final   03/08/2022 1.74 0.40 - 4.00 mU/L Final   04/15/2020 1.77 0.40 - 4.00 mU/L Final       Creatinine   Date Value Ref Range Status   08/08/2024 1.05 0.67 - 1.17 mg/dL Final   04/15/2020 1.31 (H) 0.66 - 1.25 mg/dL Final        Latest Reference Range & Units 03/08/22 08:13 04/10/23 08:08 10/10/23 07:05 07/29/24 11:58 08/08/24 09:01 10/21/24 11:55 03/27/25 08:48 07/08/25 09:43   Free Testosterone Calculated ng/dL   7.77    8.70 7.48   FSH 1.5 - 12.4 mIU/mL     4.4  4.4 5.4   Luteinizing Hormone 1.7 - 8.6 mIU/mL     4.4  4.2 5.9   Testosterone Total 240 - 950 ng/dL 298 282 286 206 (L)  183 (L) 324 282   (L): Data is abnormally low        I personally reviewed the patient's outside records from [facility] and Care Everywhere. Summary of pertinent findings in HPI.    Impression / Plan     1.  Central hypothyroidism seeking fertility:  History of borderline low morning testosterone in March 2022 around October 2023.  Test was repeated in July 2024 with similar results.  FSH/LH not elevated.  Following that started by outside provider on testosterone cypionate injections and continued on it.  He established care with me in January 2025 was seeking fertility at that time. Testosterone was " discontinued.  Repeated labs on March 2025 total testosterone in the morning was 324, on 7/80/2025 was 282.  Has ongoing loss of libido since stopped using testosterone.    Semen Analysis was done in May 2025 came back normal.      Impression: Central hypogonadism given low/borderline low testosterone for age with ongoing symptoms.  Seeking fertility his wife is going to get gynecological surgery done for her soon.    Given ongoing symptoms we discussed today about considering starting use of clomiphene off label for central hypogonadism, he agreed with that.    Plan:  -To get MRI pituitary done.  -To get prolactin/TFTs done.  -To get ferritin level checked.  -To start Clomid 25 mg daily.  -To get testosterone/hematocrit checked after 3 months from starting Clomid.      Test and/or medications prescribed today:  Orders Placed This Encounter   Procedures     MR Pituitary w/o & w Contrast     Hematocrit     Testosterone Free and Total         Follow up: 3 months with labs       MAJO Ovalles  Endocrinology, Diabetes and Metabolism  [facility]      35  minutes spent on the date of the encounter doing chart review, history and exam, documentation and further activities per the note  The longitudinal plan of care for the diagnosis(es)/condition(s) as documented were addressed during this visit. Due to the added complexity in care, I will continue to support Ovidio in the subsequent management and with ongoing continuity of care.    Note: Chart documentation done in part with Dragon Voice Recognition software. Although reviewed after completion, some word and grammatical errors may remain.  Please consider this when interpreting information in this chart      Again, thank you for allowing me to participate in the care of your patient.      Sincerely,    MAJO Ovalles

## 2025-08-18 ENCOUNTER — TELEPHONE (OUTPATIENT)
Dept: FAMILY MEDICINE | Facility: CLINIC | Age: 30
End: 2025-08-18
Payer: COMMERCIAL